# Patient Record
Sex: MALE | Race: WHITE | NOT HISPANIC OR LATINO | Employment: FULL TIME | URBAN - METROPOLITAN AREA
[De-identification: names, ages, dates, MRNs, and addresses within clinical notes are randomized per-mention and may not be internally consistent; named-entity substitution may affect disease eponyms.]

---

## 2021-04-08 DIAGNOSIS — Z23 ENCOUNTER FOR IMMUNIZATION: ICD-10-CM

## 2021-09-08 ENCOUNTER — OFFICE VISIT (OUTPATIENT)
Dept: INTERNAL MEDICINE CLINIC | Facility: CLINIC | Age: 66
End: 2021-09-08
Payer: COMMERCIAL

## 2021-09-08 VITALS
HEIGHT: 68 IN | RESPIRATION RATE: 16 BRPM | WEIGHT: 185 LBS | BODY MASS INDEX: 28.04 KG/M2 | OXYGEN SATURATION: 98 % | TEMPERATURE: 98.1 F | DIASTOLIC BLOOD PRESSURE: 64 MMHG | SYSTOLIC BLOOD PRESSURE: 132 MMHG | HEART RATE: 80 BPM

## 2021-09-08 DIAGNOSIS — Z13.1 SCREENING FOR DIABETES MELLITUS: ICD-10-CM

## 2021-09-08 DIAGNOSIS — Z12.5 SCREENING FOR PROSTATE CANCER: ICD-10-CM

## 2021-09-08 DIAGNOSIS — Z11.59 NEED FOR HEPATITIS C SCREENING TEST: ICD-10-CM

## 2021-09-08 DIAGNOSIS — Z13.220 SCREENING FOR HYPERLIPIDEMIA: ICD-10-CM

## 2021-09-08 DIAGNOSIS — Z13.0 SCREENING FOR DEFICIENCY ANEMIA: ICD-10-CM

## 2021-09-08 DIAGNOSIS — Z23 NEED FOR VACCINATION: ICD-10-CM

## 2021-09-08 DIAGNOSIS — Z00.00 WELL ADULT EXAM: Primary | ICD-10-CM

## 2021-09-08 DIAGNOSIS — Z12.11 SCREENING FOR COLON CANCER: ICD-10-CM

## 2021-09-08 PROCEDURE — 3288F FALL RISK ASSESSMENT DOCD: CPT | Performed by: INTERNAL MEDICINE

## 2021-09-08 PROCEDURE — 90750 HZV VACC RECOMBINANT IM: CPT | Performed by: INTERNAL MEDICINE

## 2021-09-08 PROCEDURE — 1036F TOBACCO NON-USER: CPT | Performed by: INTERNAL MEDICINE

## 2021-09-08 PROCEDURE — 4040F PNEUMOC VAC/ADMIN/RCVD: CPT | Performed by: INTERNAL MEDICINE

## 2021-09-08 PROCEDURE — 99387 INIT PM E/M NEW PAT 65+ YRS: CPT | Performed by: INTERNAL MEDICINE

## 2021-09-08 PROCEDURE — 90472 IMMUNIZATION ADMIN EACH ADD: CPT | Performed by: INTERNAL MEDICINE

## 2021-09-08 PROCEDURE — 90732 PPSV23 VACC 2 YRS+ SUBQ/IM: CPT | Performed by: INTERNAL MEDICINE

## 2021-09-08 PROCEDURE — 1101F PT FALLS ASSESS-DOCD LE1/YR: CPT | Performed by: INTERNAL MEDICINE

## 2021-09-08 PROCEDURE — 90471 IMMUNIZATION ADMIN: CPT | Performed by: INTERNAL MEDICINE

## 2021-09-08 PROCEDURE — 1160F RVW MEDS BY RX/DR IN RCRD: CPT | Performed by: INTERNAL MEDICINE

## 2021-09-08 PROCEDURE — 3008F BODY MASS INDEX DOCD: CPT | Performed by: INTERNAL MEDICINE

## 2021-09-08 PROCEDURE — 3725F SCREEN DEPRESSION PERFORMED: CPT | Performed by: INTERNAL MEDICINE

## 2021-09-08 NOTE — PROGRESS NOTES
Assessment/Plan:    1  Well adult exam     2  Screening for deficiency anemia  Ambulatory referral to Gastroenterology    CBC and differential    Comprehensive metabolic panel    Lipid panel    Urinalysis with microscopic   3  Screening for diabetes mellitus  CBC and differential    Comprehensive metabolic panel    Lipid panel    Urinalysis with microscopic   4  Screening for hyperlipidemia  CBC and differential    Comprehensive metabolic panel    Lipid panel    Urinalysis with microscopic   5  Screening for prostate cancer  PSA, Total Screen   6  Screening for colon cancer  Cologuard    CANCELED: Ambulatory referral for colonoscopy   7  Need for hepatitis C screening test  Hepatitis C Antibody ([facility])        A&P Notes:    Subjective:      Patient ID: Ele Gurrola is a 78 y o  male who is here today for wellness visit and to establish care  He last saw primary care doctor over 10 years ago  He has enjoyed good health  Underwent cholecystectomy 10-15 years ago had a complication of a neck common bile duct which required surgical repair thereafter  Since that time he does have some chronic indigestion  Currently is no digestive symptoms but had a diarrheal illness with some nausea, no vomiting and some crampy abdominal pain last month for about 10 days  He has had no recurrence symptoms since  His medical history is very favorable  He had pneumonia around age 35, but otherwise had no acute medical illnesses requiring hospitalization  He has no chronic medical problems including hypertension, diabetes, hyperlipidemia and has no history of heart disease  He is still working full time from home during the pandemic and has made a good transition  His wife is also working full-time  [place] the [place] [address] [place] and is physically active  This includes chopping wood, going for long walks and he has excellent exercise tolerance    This includes no chest pain or shortness of breath  He sees an eye doctor every other year and reports good vision with some presbyopia  He reports excellent hearing  He has no history of skin cancer and declines dermatology screening  He declines colonoscopy because his wife had a bad experience  He has had Cologuard the past that has been negative  He is agreeable to Cologuard testing  We discussed vaccines and he has had the [address] vaccine series, had TdaP vaccination in the last 2-3 years, and we did discussed vaccination for shingles and Pneumovax which were today  He will come in in 2 months for repeat shingles vaccination  History reviewed  No pertinent past medical history  Family History   Problem Relation Age of Onset    No Known Problems Mother     Stroke Father     Breast cancer Sister         Social History     Socioeconomic History    Marital status: /Civil Union     Spouse name: Not on file    Number of children: Not on file    Years of education: Not on file    Highest education level: Not on file   Occupational History    Not on file   Tobacco Use    Smoking status: Never Smoker    Smokeless tobacco: Never Used   Vaping Use    Vaping Use: Never used   Substance and Sexual Activity    Alcohol use: Yes     Comment: social    Drug use: Never    Sexual activity: Not on file   Other Topics Concern    Not on file   Social History Narrative    Not on file     Social Determinants of Health     Financial Resource Strain:     Difficulty of Paying Living Expenses:    Food Insecurity:     Worried About Running Out of Food in the Last Year:     Ran Out of Food in the Last Year:    Transportation Needs:     Lack of Transportation (Medical):      Lack of Transportation (Non-Medical):    Physical Activity:     Days of Exercise per Week:     Minutes of Exercise per Session:    Stress:     Feeling of Stress :    Social Connections:     Frequency of Communication with Friends and Family:  Frequency of Social Gatherings with Friends and Family:     Attends Christianity Services:     Active Member of Clubs or Organizations:     Attends Club or Organization Meetings:     Marital Status:    Intimate Partner Violence:     Fear of Current or Ex-Partner:     Emotionally Abused:     Physically Abused:     Sexually Abused:         No Known Allergies     No current outpatient medications on file  No current facility-administered medications for this visit  Review of Systems   Constitutional: Negative  HENT: Negative  Eyes: Negative  Respiratory: Negative  Cardiovascular: Negative  Gastrointestinal: Negative  Endocrine: Negative  Genitourinary: Negative  Musculoskeletal: Negative  Skin: Negative  Allergic/Immunologic: Negative  Neurological: Negative  Hematological: Negative  Psychiatric/Behavioral: Negative  Objective:      /64   Pulse 80   Temp 98 1 °F (36 7 °C)   Resp 16   Ht 5' 8" (1 727 m)   Wt 83 9 kg (185 lb)   SpO2 98%   BMI 28 13 kg/m²      Wt Readings from Last 3 Encounters:   09/08/21 83 9 kg (185 lb)     Temp Readings from Last 3 Encounters:   09/08/21 98 1 °F (36 7 °C)     BP Readings from Last 3 Encounters:   09/08/21 132/64     Pulse Readings from Last 3 Encounters:   09/08/21 80       Physical Exam   VSS, afebrile, pulse regular    Alert and Oriented in NAD    HEENT: NCAT, Pupils equal, 3 mm, EOEMI, no icterus or pallor, no iritis or conjunctivitis  No head or neck mass or adenopathy  ENT: NAD    Ears:  normal-appearing external auditory canals and tympanic membranes,     Nose: patent nasal passages without polyps or masses, no septal deviation, no nasal deformity,     Oral cavity and throat: normal dentition, no gum disease, normal mucosa, patent airway, no hemorrhages or exudates in the throat    Exam of salivary glands and ducts are normal  No submandibular or submental adenopathy    Neck: supple, no trauma or tenderness  No JVD  Normal carotid upstroke and descent without bruits  Trachea midline without stridor  Thyroid exam normal on inspection and palpation  No spinal tenderness, full range of motion  Lymphatics: no adenopathy throughout    Chest:  No trauma or deformity, no supraclavicular adenopathy or bruit  Chest wall expansion is symmetric and normal, expiratory phase is not prolonged  Heart:  Regular rate and rhythm, normal W4-I8, no T9-F8, no clicks murmurs or rubs  Lungs:  Clear to auscultation and normal to percussion  Back:  No trauma or deformity, no CVA mass or CVA tenderness  Skin:  No rash or skin malignancy  There are scattered hemangiomas and benign keratoses  There is small abrasion of the anterior left ankle  Abdomen:  Nondistended, normal bowel sounds, soft nontender without masses bruits organomegaly  There is no hernia  There is a well-healed right subcostal scar without hernia  Extremities:  Arterial circulation is symmetrically normal with no clubbing cyanosis or edema  There are no varicosities, there is no calf tenderness or phlebitic findings  Joints:  No  swelling, redness, tenderness or increased temperature, no instability  There are no tophi  Affect:  Normal    Neurologic:  Normal and stable gait, and otherwise no focal findings as well  Cognitive: Intact

## 2021-09-16 LAB
ALBUMIN SERPL-MCNC: 4.2 G/DL (ref 3.8–4.8)
ALBUMIN/GLOB SERPL: 1.6 {RATIO} (ref 1.2–2.2)
ALP SERPL-CCNC: 76 IU/L (ref 44–121)
ALT SERPL-CCNC: 20 IU/L (ref 0–44)
AST SERPL-CCNC: 24 IU/L (ref 0–40)
BASOPHILS # BLD AUTO: 0.1 X10E3/UL (ref 0–0.2)
BASOPHILS NFR BLD AUTO: 1 %
BILIRUB SERPL-MCNC: 0.5 MG/DL (ref 0–1.2)
BUN SERPL-MCNC: 11 MG/DL (ref 8–27)
BUN/CREAT SERPL: 12 (ref 10–24)
CALCIUM SERPL-MCNC: 9.3 MG/DL (ref 8.6–10.2)
CHLORIDE SERPL-SCNC: 107 MMOL/L (ref 96–106)
CHOLEST SERPL-MCNC: 188 MG/DL (ref 100–199)
CO2 SERPL-SCNC: 20 MMOL/L (ref 20–29)
CREAT SERPL-MCNC: 0.91 MG/DL (ref 0.76–1.27)
EOSINOPHIL # BLD AUTO: 0.5 X10E3/UL (ref 0–0.4)
EOSINOPHIL NFR BLD AUTO: 9 %
ERYTHROCYTE [DISTWIDTH] IN BLOOD BY AUTOMATED COUNT: 13.1 % (ref 11.6–15.4)
GLOBULIN SER-MCNC: 2.6 G/DL (ref 1.5–4.5)
GLUCOSE SERPL-MCNC: 114 MG/DL (ref 65–99)
HCT VFR BLD AUTO: 46.8 % (ref 37.5–51)
HCV AB S/CO SERPL IA: <0.1 S/CO RATIO (ref 0–0.9)
HDLC SERPL-MCNC: 43 MG/DL
HGB BLD-MCNC: 16 G/DL (ref 13–17.7)
IMM GRANULOCYTES # BLD: 0 X10E3/UL (ref 0–0.1)
IMM GRANULOCYTES NFR BLD: 0 %
LDLC SERPL CALC-MCNC: 128 MG/DL (ref 0–99)
LYMPHOCYTES # BLD AUTO: 1.5 X10E3/UL (ref 0.7–3.1)
LYMPHOCYTES NFR BLD AUTO: 27 %
MCH RBC QN AUTO: 30.8 PG (ref 26.6–33)
MCHC RBC AUTO-ENTMCNC: 34.2 G/DL (ref 31.5–35.7)
MCV RBC AUTO: 90 FL (ref 79–97)
MONOCYTES # BLD AUTO: 0.5 X10E3/UL (ref 0.1–0.9)
MONOCYTES NFR BLD AUTO: 9 %
NEUTROPHILS # BLD AUTO: 3 X10E3/UL (ref 1.4–7)
NEUTROPHILS NFR BLD AUTO: 54 %
PLATELET # BLD AUTO: 230 X10E3/UL (ref 150–450)
POTASSIUM SERPL-SCNC: 4.3 MMOL/L (ref 3.5–5.2)
PROT SERPL-MCNC: 6.8 G/DL (ref 6–8.5)
PSA SERPL-MCNC: 2.7 NG/ML (ref 0–4)
RBC # BLD AUTO: 5.19 X10E6/UL (ref 4.14–5.8)
SL AMB EGFR AFRICAN AMERICAN: 100 ML/MIN/1.73
SL AMB EGFR NON AFRICAN AMERICAN: 87 ML/MIN/1.73
SL AMB INTERPRETATION: NORMAL
SL AMB VLDL CHOLESTEROL CALC: 17 MG/DL (ref 5–40)
SODIUM SERPL-SCNC: 140 MMOL/L (ref 134–144)
TRIGL SERPL-MCNC: 93 MG/DL (ref 0–149)
WBC # BLD AUTO: 5.5 X10E3/UL (ref 3.4–10.8)

## 2021-10-12 LAB — COLOGUARD RESULT REPORTABLE: NEGATIVE

## 2021-11-05 ENCOUNTER — CONSULT (OUTPATIENT)
Dept: GASTROENTEROLOGY | Facility: CLINIC | Age: 66
End: 2021-11-05
Payer: COMMERCIAL

## 2021-11-05 VITALS
HEIGHT: 68 IN | BODY MASS INDEX: 27.58 KG/M2 | DIASTOLIC BLOOD PRESSURE: 88 MMHG | SYSTOLIC BLOOD PRESSURE: 124 MMHG | HEART RATE: 55 BPM | WEIGHT: 182 LBS

## 2021-11-05 DIAGNOSIS — Z13.0 SCREENING FOR DEFICIENCY ANEMIA: ICD-10-CM

## 2021-11-05 DIAGNOSIS — Z12.11 SCREENING FOR COLON CANCER: Primary | ICD-10-CM

## 2021-11-05 PROCEDURE — 99244 OFF/OP CNSLTJ NEW/EST MOD 40: CPT | Performed by: INTERNAL MEDICINE

## 2021-11-05 PROCEDURE — 3008F BODY MASS INDEX DOCD: CPT | Performed by: INTERNAL MEDICINE

## 2021-11-05 PROCEDURE — 1036F TOBACCO NON-USER: CPT | Performed by: INTERNAL MEDICINE

## 2021-11-08 ENCOUNTER — TELEPHONE (OUTPATIENT)
Dept: GASTROENTEROLOGY | Facility: CLINIC | Age: 66
End: 2021-11-08

## 2022-01-20 ENCOUNTER — TELEPHONE (OUTPATIENT)
Dept: GASTROENTEROLOGY | Facility: CLINIC | Age: 67
End: 2022-01-20

## 2022-01-20 NOTE — TELEPHONE ENCOUNTER
Christa patient - Patient called and would like a RTRN call ([phone]) as he has questions about his upcoming procedure on 1/28 Thx

## 2022-01-21 NOTE — TELEPHONE ENCOUNTER
Spoke with patient and he lost his prep instructions which I emailed to him at [email or web address]  He was also concerned about covid in the hospital and whether it was safe  Provided him with information regarding the hospital following cdc guidelines for cleaning etc and advised it is a personal decision  He said he would keep the appt

## 2022-01-27 ENCOUNTER — TELEPHONE (OUTPATIENT)
Dept: GASTROENTEROLOGY | Facility: HOSPITAL | Age: 67
End: 2022-01-27

## 2022-01-28 ENCOUNTER — HOSPITAL ENCOUNTER (OUTPATIENT)
Dept: GASTROENTEROLOGY | Facility: HOSPITAL | Age: 67
Setting detail: OUTPATIENT SURGERY
Discharge: HOME | End: 2022-01-28
Attending: INTERNAL MEDICINE | Admitting: INTERNAL MEDICINE
Payer: COMMERCIAL

## 2022-01-28 ENCOUNTER — ANESTHESIA EVENT (OUTPATIENT)
Dept: GASTROENTEROLOGY | Facility: HOSPITAL | Age: 67
End: 2022-01-28

## 2022-01-28 ENCOUNTER — ANESTHESIA (OUTPATIENT)
Dept: GASTROENTEROLOGY | Facility: HOSPITAL | Age: 67
End: 2022-01-28

## 2022-01-28 VITALS
SYSTOLIC BLOOD PRESSURE: 103 MMHG | RESPIRATION RATE: 20 BRPM | DIASTOLIC BLOOD PRESSURE: 65 MMHG | HEIGHT: 68 IN | WEIGHT: 183.2 LBS | TEMPERATURE: 98.3 F | HEART RATE: 45 BPM | OXYGEN SATURATION: 94 % | BODY MASS INDEX: 27.77 KG/M2

## 2022-01-28 DIAGNOSIS — Z13.0 SCREENING FOR DEFICIENCY ANEMIA: ICD-10-CM

## 2022-01-28 PROCEDURE — G0121 COLON CA SCRN NOT HI RSK IND: HCPCS | Performed by: INTERNAL MEDICINE

## 2022-01-28 RX ORDER — PROPOFOL 10 MG/ML
INJECTION, EMULSION INTRAVENOUS AS NEEDED
Status: DISCONTINUED | OUTPATIENT
Start: 2022-01-28 | End: 2022-01-28

## 2022-01-28 RX ORDER — SODIUM CHLORIDE, SODIUM LACTATE, POTASSIUM CHLORIDE, CALCIUM CHLORIDE 600; 310; 30; 20 MG/100ML; MG/100ML; MG/100ML; MG/100ML
INJECTION, SOLUTION INTRAVENOUS CONTINUOUS PRN
Status: DISCONTINUED | OUTPATIENT
Start: 2022-01-28 | End: 2022-01-28

## 2022-01-28 RX ORDER — LIDOCAINE HYDROCHLORIDE 10 MG/ML
INJECTION, SOLUTION EPIDURAL; INFILTRATION; INTRACAUDAL; PERINEURAL AS NEEDED
Status: DISCONTINUED | OUTPATIENT
Start: 2022-01-28 | End: 2022-01-28

## 2022-01-28 RX ADMIN — SODIUM CHLORIDE, SODIUM LACTATE, POTASSIUM CHLORIDE, AND CALCIUM CHLORIDE: .6; .31; .03; .02 INJECTION, SOLUTION INTRAVENOUS at 07:10

## 2022-01-28 RX ADMIN — PROPOFOL 80 MG: 10 INJECTION, EMULSION INTRAVENOUS at 07:19

## 2022-01-28 RX ADMIN — PROPOFOL 20 MG: 10 INJECTION, EMULSION INTRAVENOUS at 07:29

## 2022-01-28 RX ADMIN — PROPOFOL 20 MG: 10 INJECTION, EMULSION INTRAVENOUS at 07:31

## 2022-01-28 RX ADMIN — PROPOFOL 40 MG: 10 INJECTION, EMULSION INTRAVENOUS at 07:26

## 2022-01-28 RX ADMIN — PROPOFOL 40 MG: 10 INJECTION, EMULSION INTRAVENOUS at 07:23

## 2022-01-28 RX ADMIN — PROPOFOL 20 MG: 10 INJECTION, EMULSION INTRAVENOUS at 07:22

## 2022-01-28 RX ADMIN — LIDOCAINE HYDROCHLORIDE 50 MG: 10 INJECTION, SOLUTION EPIDURAL; INFILTRATION; INTRACAUDAL at 07:19

## 2022-01-28 NOTE — ANESTHESIA PREPROCEDURE EVALUATION
Procedure:  COLONOSCOPY    Relevant Problems   No relevant active problems      CAD/PCI/MI/CHF - Denies  COPD/ASTHMA/PACO - Denies  GERD - Endorses, not on medication  PROBLEMS WITH PRIOR ANESTHESIA - Denies  NPO STATUS - Patient reports last solid PO intake on Wednesday and last clear liquid PO intake at 10 PM last night      Lab Results   Component Value Date    WBC 5 5 09/15/2021    HGB 16 0 09/15/2021    HCT 46 8 09/15/2021    MCV 90 09/15/2021     09/15/2021     Lab Results   Component Value Date    K 4 3 09/15/2021    CO2 20 09/15/2021     (H) 09/15/2021    BUN 11 09/15/2021    CREATININE 0 91 09/15/2021       Physical Exam    Airway    Mallampati score: II  TM Distance: >3 FB  Neck ROM: full     Dental   Comment: Partial upper denture,     Cardiovascular  Rhythm: regular,     Pulmonary  Pulmonary exam normal     Other Findings        Anesthesia Plan  ASA Score- 2     Anesthesia Type- IV sedation with anesthesia with ASA Monitors  Additional Monitors:   Airway Plan:           Plan Factors-Exercise tolerance (METS): >4 METS  Chart reviewed  Patient is not a current smoker  Patient did not smoke on day of surgery  Obstructive sleep apnea risk education given perioperatively  Induction- intravenous  Postoperative Plan-     Informed Consent- Anesthetic plan and risks discussed with patient  I personally reviewed this patient with the CRNA  Discussed and agreed on the Anesthesia Plan with the KYLAH Munson

## 2022-01-28 NOTE — ANESTHESIA POSTPROCEDURE EVALUATION
Post-Op Assessment Note    CV Status:  Stable    Pain management: adequate     Mental Status:  Sleepy and arousable   Hydration Status:  Euvolemic   PONV Controlled:  Controlled   Airway Patency:  Patent      Post Op Vitals Reviewed: Yes      Staff: CRNA         No complications documented      /60 (01/28/22 0739)    Temp 98 3 °F (36 8 °C) (01/28/22 0739)    Pulse (!) 46 (01/28/22 0739)   Resp 16 (01/28/22 0739)    SpO2 99 % (01/28/22 0739)

## 2022-01-28 NOTE — H&P
History and Physical -  [facility]  Karen Hyman 78 y o  male MRN: [id number]      HPI: Karen Hyman is a 78y o  year old male who presents for screening colonoscopy      REVIEW OF SYSTEMS: Per the HPI, and otherwise unremarkable  Historical Information   History reviewed  No pertinent past medical history  Past Surgical History:   Procedure Laterality Date    BILE DUCT EXPLORATION      GALLBLADDER SURGERY      HERNIA REPAIR       Social History   Social History     Substance and Sexual Activity   Alcohol Use Yes    Comment: social     Social History     Substance and Sexual Activity   Drug Use Never     Social History     Tobacco Use   Smoking Status Never Smoker   Smokeless Tobacco Never Used     Family History   Problem Relation Age of Onset    No Known Problems Mother     Stroke Father     Breast cancer Sister        Meds/Allergies     (Not in a hospital admission)      No Known Allergies    Objective     Blood pressure 156/83, pulse 55, temperature 98 °F (36 7 °C), temperature source Temporal, resp  rate 16, height 5' 8" (1 727 m), weight 83 1 kg (183 lb 3 2 oz), SpO2 99 %  PHYSICAL EXAM    Gen: NAD  CV: RRR  CHEST: Clear  ABD: soft, NT/ND  EXT: no edema      ASSESSMENT/PLAN:  This is a 78y o  year old male here for colonoscopy the, and he is stable and optimized for his procedure

## 2022-03-18 DIAGNOSIS — M25.551 RIGHT HIP PAIN: ICD-10-CM

## 2022-03-18 DIAGNOSIS — M25.552 LEFT HIP PAIN: Primary | ICD-10-CM

## 2022-03-22 ENCOUNTER — APPOINTMENT (OUTPATIENT)
Dept: RADIOLOGY | Facility: CLINIC | Age: 67
End: 2022-03-22
Payer: COMMERCIAL

## 2022-03-22 ENCOUNTER — OFFICE VISIT (OUTPATIENT)
Dept: OBGYN CLINIC | Facility: CLINIC | Age: 67
End: 2022-03-22
Payer: COMMERCIAL

## 2022-03-22 VITALS
BODY MASS INDEX: 28.46 KG/M2 | SYSTOLIC BLOOD PRESSURE: 126 MMHG | HEART RATE: 53 BPM | WEIGHT: 187.8 LBS | HEIGHT: 68 IN | DIASTOLIC BLOOD PRESSURE: 85 MMHG

## 2022-03-22 DIAGNOSIS — M16.0 BILATERAL PRIMARY OSTEOARTHRITIS OF HIP: Primary | ICD-10-CM

## 2022-03-22 DIAGNOSIS — M25.551 RIGHT HIP PAIN: ICD-10-CM

## 2022-03-22 DIAGNOSIS — M25.552 LEFT HIP PAIN: ICD-10-CM

## 2022-03-22 PROCEDURE — 73522 X-RAY EXAM HIPS BI 3-4 VIEWS: CPT

## 2022-03-22 PROCEDURE — 99203 OFFICE O/P NEW LOW 30 MIN: CPT | Performed by: ORTHOPAEDIC SURGERY

## 2022-03-22 PROCEDURE — 1160F RVW MEDS BY RX/DR IN RCRD: CPT | Performed by: ORTHOPAEDIC SURGERY

## 2022-03-22 PROCEDURE — 1036F TOBACCO NON-USER: CPT | Performed by: ORTHOPAEDIC SURGERY

## 2022-03-22 PROCEDURE — 3008F BODY MASS INDEX DOCD: CPT | Performed by: ORTHOPAEDIC SURGERY

## 2022-04-06 NOTE — PROGRESS NOTES
PT Evaluation     Today's date: 2022  Patient name: Mel Akhtar  : 1955  MRN: [id number]  Referring provider: Sadaf Fernandez MD  Dx:   Encounter Diagnosis     ICD-10-CM    1  Bilateral primary osteoarthritis of hip  M16 0 Ambulatory Referral to Physical Therapy                  Assessment  Assessment details: Mel Akhtar is a 78 y o  male who presents with signs and symptoms consistent of referring diagnosis  Patient presents with pain, decreased strength, decreased ROM and decreased joint mobility  Due to these impairments, Patient has difficulty performing a/iadls, recreational activities and engaging in social activities  Patient shows large amounts of muscle tightness around both hips but most notably around the R hip  He also presents with mild strength deficits which has led to diminished functional mobility and decreased activity tolerance  Patient would benefit from a comprehensive exercise program and HEP focusing on improving ROM, strength, motor control, functional mobility, and activity tolerance  Patient would benefit from skilled physical therapy to address the impairments, improve their level of function, and to improve their overall quality of life  PT POC 1x/wk for 8 weeks due to high co-insurance  Thank you for this referral      Impairments: abnormal gait, abnormal muscle firing, abnormal or restricted ROM, activity intolerance, impaired balance, impaired physical strength, lacks appropriate home exercise program and pain with function  Barriers to therapy: High co-insurance  Understanding of Dx/Px/POC: good   Prognosis: good    Goals  Short Term Goals: to be achieved by 4 weeks  1) Patient to be independent with basic HEP  2) Decrease pain to 3/10 at its worst   3) Increase hip flexion and abuction ROM by 5-10 degrees   4) Increase LE strength by 1/2 MMT grade in all deficient planes      Long Term Goals: to be achieved by discharge  1) FOTO equal to or greater than 69   2) Ambulation to improve to maximal level of function  3) Patient will be able to squat without pain  4) Patient will show symmetrical ROM in both hips B/L     5) Patient will be independent with comprehensive HEP  6) Patient will be able to walk for 30 minutes without an increase in pain  Plan  Plan details: PT POC 1x/wk for 8 weeks  Patient would benefit from: skilled physical therapy  Planned modality interventions: cryotherapy, thermotherapy: hydrocollator packs and manual electrical stimulation  Planned therapy interventions: activity modification, abdominal trunk stabilization, ADL retraining, ADL training, joint mobilization, massage, manual therapy, neuromuscular re-education, patient education, therapeutic activities, therapeutic exercise, home exercise program, gait training, functional ROM exercises and flexibility  Frequency: 1x week  Duration in weeks: 8  Plan of Care beginning date: 4/6/2022  Plan of Care expiration date: 6/1/2022  Treatment plan discussed with: patient        Subjective Evaluation    History of Present Illness  Mechanism of injury: History of Current Injury: Patient reports to PT this date because he has pain in both of his hips while walking and performing activities around the house  He reports that his hip pain has progressively gotten worse over the past 3-4 months as he has become more sedentary  Patient notes that the more he walks, the more painful it becomes  Currently, he is worried that he won't be able to be as active as he can this coming summer  Pain location/Descriptors: Both hips R moreso then left into the groin and sporadically into the legs   Aggravating factors: Walking, some movements  Easing factors: Rest  Imaging: Yes, X-ray   Special Questions: Luigi Carrion denies a new onset of Bladder incontinence, Bowel dysfunction and Ataxia  Denies saddle paresthesia     Patient goals: Be able to return to the things like hiking and walking   Hobbies/Interest: Skiing, hiking, walking   Occupation: work from home-      Pain  Current pain ratin  At best pain ratin  At worst pain ratin  Quality: dull ache and sharp    Patient Goals  Patient goals for therapy: increased strength, independence with ADLs/IADLs, return to sport/leisure activities, increased motion and decreased pain          Objective     Active Range of Motion   Left Hip   Flexion: 100 degrees with pain  Abduction: 40 degrees     Right Hip   Flexion: 100 degrees with pain  Abduction: 40 degrees with pain    Strength/Myotome Testing     Left Hip   Planes of Motion   Flexion: 4+  Extension: 4+  Abduction: 4  External rotation: 4+  Internal rotation: 4+    Right Hip   Planes of Motion   Flexion: 4+  Extension: 4  Abduction: 4  External rotation: 4  Internal rotation: 4+    Left Knee   Flexion: 5  Extension: 5    Right Knee   Flexion: 5  Extension: 5    Tests     Left Hip   Positive FADIR and scour  Negative ZOFIA  Modified Vick: Positive  Right Hip   Positive ZOFIA and FADIR  Modified Vick: Positive  Ambulation     Observational Gait   Gait: within functional limits     Functional Assessment      Squat    Left tibial anterior translation beyond toes and right tibial anterior translation beyond toes                Diagnosis: B/L Hip OA    Precautions: R>L- 30 mins sessions, hi co-insurance   POC Expires:    Re-evaluation Date:    FOTO Scores/Date: 69 ()   Visit Count        Manuals        LAD        Distraction with ER/IR                                Ther Ex        Bike        SLR        Hamstring Stretch        Quad Stretch HEP       Piriformis Stretch HEP       Clamshells        Glute Bridge                        Neuro Re-Ed        [employer] over hip extensions                                                                                               Ther Act              Step uos              TRX Squat             Modalities

## 2022-04-07 ENCOUNTER — EVALUATION (OUTPATIENT)
Dept: PHYSICAL THERAPY | Facility: CLINIC | Age: 67
End: 2022-04-07
Payer: COMMERCIAL

## 2022-04-07 DIAGNOSIS — M16.0 BILATERAL PRIMARY OSTEOARTHRITIS OF HIP: ICD-10-CM

## 2022-04-07 PROCEDURE — 97161 PT EVAL LOW COMPLEX 20 MIN: CPT

## 2022-04-14 ENCOUNTER — APPOINTMENT (OUTPATIENT)
Dept: PHYSICAL THERAPY | Facility: CLINIC | Age: 67
End: 2022-04-14
Payer: COMMERCIAL

## 2022-04-20 ENCOUNTER — OFFICE VISIT (OUTPATIENT)
Dept: PHYSICAL THERAPY | Facility: CLINIC | Age: 67
End: 2022-04-20
Payer: COMMERCIAL

## 2022-04-20 DIAGNOSIS — M16.0 BILATERAL PRIMARY OSTEOARTHRITIS OF HIP: Primary | ICD-10-CM

## 2022-04-20 PROCEDURE — 97140 MANUAL THERAPY 1/> REGIONS: CPT

## 2022-04-20 PROCEDURE — 97110 THERAPEUTIC EXERCISES: CPT

## 2022-04-20 NOTE — PROGRESS NOTES
Daily Note     Today's date: 2022  Patient name: Edis Gutierrez  : 1955  MRN: [id number]  Referring provider: Rod Durand MD  Dx:   Encounter Diagnosis     ICD-10-CM    1  Bilateral primary osteoarthritis of hip  M16 0                   Subjective: Patient reports increased hip pain this date as he assisted his wife in moving furniture around the house  He also notes that he has been unable to walk for long distances as their is an increase in hip pain  Does note not being compliant with hip stretches due to being very busy  Objective: See treatment diary below      Assessment: Tolerated treatment well  Session focused on providing patient with comprehensive HEP including stretching and strengthening exercises  Patient responded well to manual treatment with decreased pain during ambulation post LAD  Patient reported discomfort during exercises which provided resistance for the hip musculature  Instructed patient on beginning exercise regiment without resistance and building up as tolerance improved  Also explained importance of completing HEP to reduce symptoms  Patient was receptive to both pieces of information  Progress as tolerated  Patient would benefit from continued PT      Plan: Continue per plan of care        Diagnosis: B/L Hip OA    Precautions: R>L- 30 mins sessions, hi co-insurance   POC Expires:    Re-evaluation Date:    FOTO Scores/Date: 69 ()   Visit Count        Manuals  2      LAD  EB B/L      Distraction with ER/IR  EB R only                               Ther Ex        Bike        SLR        Hamstring Stretch  3x30" B/L      Quad Stretch HEP       Piriformis Stretch HEP       Clamshells  15x ea side       Glute Bridge  x10       Hip 3 way   Flex, abd, ext 10x ea direction rtb               Neuro Re-Ed        [employer] over hip extensions                                                                                               Ther Act              Step ups              TRX Squat             Modalities

## 2022-04-20 NOTE — RESULT ENCOUNTER NOTE
Simón Jerome,  Your labs are optimal overall  A blood sugar <125 is normal, so the value of 114 for your glucose is normal  Your cholesterol profile is optimal, PSA is normal as is your CBC  Your Hepatitis screen is negative     Dr Deana Boone [de-identified] : Euflexxa # 2 Bilateral knee\par Discussed at length with the patient the planned Euflexxa injection. The risks, benefits, convalescence and alternatives were reviewed. The possible side effects discussed included but were not limited to: pain, swelling, heat and redness. There symptoms are generally mild but if they are extensive then contact the office. Giving pain relievers by mouth such as NSAID’s or Tylenol can generally treat the reactions to Euflexxa. Rare cases of infection have been noted. Rash, hives and itching may occur post injection. If you have muscle pain or cramps, flushing and or swelling of the face, rapid heart beat, nausea, dizziness, fever, chills, headache, difficulty breathing, swelling in the arms or legs, or have a prickly feeling of your skin, contact a health care provider immediately.\par \par Following this discussion, the knee was prepped with betadine and under sterile condition the Euflexxa injection was performed with a 22 gauge needle. The needle was introduced into the joint, aspiration was performed to ensure intra-articular placement and the medication was injected. Upon withdrawal of the needle the site was cleaned with alcohol and a bandaid applied. The patient tolerated the injection well and there were no adverse effects. Post injection instructions included no strenuous activity for 24 hours, cryotherapy and if there are any adverse effects to contact the office.\par

## 2022-04-21 ENCOUNTER — APPOINTMENT (OUTPATIENT)
Dept: PHYSICAL THERAPY | Facility: CLINIC | Age: 67
End: 2022-04-21
Payer: COMMERCIAL

## 2022-04-27 ENCOUNTER — OFFICE VISIT (OUTPATIENT)
Dept: PHYSICAL THERAPY | Facility: CLINIC | Age: 67
End: 2022-04-27
Payer: COMMERCIAL

## 2022-04-27 DIAGNOSIS — M16.0 BILATERAL PRIMARY OSTEOARTHRITIS OF HIP: Primary | ICD-10-CM

## 2022-04-27 PROCEDURE — 97140 MANUAL THERAPY 1/> REGIONS: CPT

## 2022-04-27 PROCEDURE — 97110 THERAPEUTIC EXERCISES: CPT

## 2022-04-27 NOTE — PROGRESS NOTES
Daily Note     Today's date: 2022  Patient name: Mark Collins  : 1955  MRN: [id number]  Referring provider: Dionisio Godfrey MD  Dx:   Encounter Diagnosis     ICD-10-CM    1  Bilateral primary osteoarthritis of hip  M16 0        Start Time: 1630  Stop Time: 1700  Total time in clinic (min): 30 minutes    Subjective: pt reports he thinks his hips are feeling a little better  Stated that he has pain in the R groin  Objective: See treatment diary below      Assessment: Tolerated treatment well  Patient would benefit from continued PT  Added rec  Bike w/ good tolerance  Reviewed and updated HEP  Increased reps with bridges w/ good tolerance  Pt had decreased pain in groin post session  Plan: Continue per plan of care        Diagnosis: B/L Hip OA    Precautions: R>L- 30 mins sessions, hi co-insurance   POC Expires:    Re-evaluation Date:    FOTO Scores/Date: 69 ()   Visit Count   3     Manuals  2      LAD  EB B/L JH b/l     Distraction with ER/IR  EB R only  JH R only                             Ther Ex        Bike   5'     SLR   2x10     Hamstring Stretch  3x30" B/L 3x30" B/L     Quad Stretch HEP       Piriformis Stretch HEP       Clamshells  15x ea side       Glute Bridge  x10  2x10     Hip 3 way   Flex, abd, ext 10x ea direction rtb               Neuro Re-Ed        [employer] over hip extensions                                                                                               Ther Act              Step ups              TRX Squat      2x10       Modalities

## 2022-05-03 ENCOUNTER — OFFICE VISIT (OUTPATIENT)
Dept: OBGYN CLINIC | Facility: CLINIC | Age: 67
End: 2022-05-03
Payer: COMMERCIAL

## 2022-05-03 VITALS
BODY MASS INDEX: 28.22 KG/M2 | HEART RATE: 57 BPM | HEIGHT: 68 IN | DIASTOLIC BLOOD PRESSURE: 79 MMHG | WEIGHT: 186.2 LBS | SYSTOLIC BLOOD PRESSURE: 130 MMHG

## 2022-05-03 DIAGNOSIS — M79.605 BILATERAL LEG PAIN: Primary | ICD-10-CM

## 2022-05-03 DIAGNOSIS — M79.604 BILATERAL LEG PAIN: Primary | ICD-10-CM

## 2022-05-03 PROCEDURE — 1160F RVW MEDS BY RX/DR IN RCRD: CPT | Performed by: ORTHOPAEDIC SURGERY

## 2022-05-03 PROCEDURE — 99213 OFFICE O/P EST LOW 20 MIN: CPT | Performed by: ORTHOPAEDIC SURGERY

## 2022-05-03 PROCEDURE — 1036F TOBACCO NON-USER: CPT | Performed by: ORTHOPAEDIC SURGERY

## 2022-05-03 NOTE — PROGRESS NOTES
Orthopaedics Office Visit - Follow up Patient Visit    ASSESSMENT/PLAN:    Assessment:   Bilateral hip osteoarthritis , mild  B/l leg pain     Cannot rule out lumbar spine pathology/radiculopathy given pain pattern and occasional radiation past knee    Patient would benefit from IA hip injection for diagnostic and possible therapeutic purposes vs lumbar spine evaluation/injections      Plan:   - Discussed conservative treatment with patient at length  - referral placed to Spine and Pain Management for further evaluation of potential lumbar radiculopathy, possible R hip IA injection  - Over the counter analgesics as needed / directed   - Ice / heat as directed   - Continue physical therapy as directed   - follow-up after evaluation by Spine and Pain pain management       To Do Next Visit:  Evaluate hip pain     _____________________________________________________  CHIEF COMPLAINT:  Chief Complaint   Patient presents with    Left Hip - Follow-up    Right Hip - Follow-up         SUBJECTIVE:  Ayala Healy is a 78 y o  male who presents  to the office for follow-up evaluation of bilateral hip pain  Patient states that his symptoms are minimally better  Patient states he continues to have anterior and lateral hip pain becomes worse with prolonged weight-bearing and range of motion of the hips  Patient states that he has been attending physical therapy as directed with no complaints  Patient states he has not been taking a type pain medication for the hips  Patient denies any numbness or tingling in his legs  Patient states that he does have some pain that wraps around the anterior aspect of his lower legs occasionally but states that it is very intermittent  Patient offers no other complaints at this time  PAST MEDICAL HISTORY:  History reviewed  No pertinent past medical history      PAST SURGICAL HISTORY:  Past Surgical History:   Procedure Laterality Date    BILE DUCT EXPLORATION      GALLBLADDER SURGERY  HERNIA REPAIR         FAMILY HISTORY:  Family History   Problem Relation Age of Onset    No Known Problems Mother     Stroke Father     Breast cancer Sister        SOCIAL HISTORY:  Social History     Tobacco Use    Smoking status: Never Smoker    Smokeless tobacco: Never Used   Vaping Use    Vaping Use: Never used   Substance Use Topics    Alcohol use: Yes     Comment: social    Drug use: Never       MEDICATIONS:  No current outpatient medications on file  ALLERGIES:  No Known Allergies    REVIEW OF SYSTEMS:  MSK: b/l hip pain   Neuro: WNL   Pertinent items are otherwise noted in HPI  A comprehensive review of systems was otherwise negative  LABS:  HgA1c: No results found for: HGBA1C  BMP:   Lab Results   Component Value Date    K 4 3 09/15/2021    CO2 20 09/15/2021     (H) 09/15/2021    BUN 11 09/15/2021    CREATININE 0 91 09/15/2021     CBC: No components found for: CBC    _____________________________________________________  PHYSICAL EXAMINATION:  Vital signs: /79   Pulse 57   Ht 5' 8" (1 727 m)   Wt 84 5 kg (186 lb 3 2 oz)   BMI 28 31 kg/m²   General: No acute distress, awake and alert  Psychiatric: Mood and affect appear appropriate  HEENT: Trachea Midline, No torticollis, no apparent facial trauma  Cardiovascular: No audible murmurs; Extremities appear perfused  Pulmonary: No audible wheezing or stridor  Skin: No open lesions; see further details (if any) below      MUSCULOSKELETAL EXAMINATION:  B/L hip examination:  - Patient sitting comfortably in the office in no acute distress   - no acute physical abnormalities present in hips  Extremities appear well perfused overall   - no bony or soft tissue tenderness palpation over the this time    - full range of motion of the hips with mild pain associated with internal rotation of the right hip  -  5/5 strength noted throughout the lower extremity muscle groups bilaterally  - NV intact    _____________________________________________________  STUDIES REVIEWED:  I personally reviewed the images and interpretation is as follows:  N/A       PROCEDURES PERFORMED:  No procedures were performed at this time           Stuart Barragan PA-C - baldomero Blackburn MD room air

## 2022-05-03 NOTE — PATIENT INSTRUCTIONS
- Discussed conservative treatment with patient at length  - referral placed to Spine and Pain Management for further evaluation of potential lumbar radiculopathy  - offered patient cortisone injection of hips    Patient declined at this time   - Over the counter analgesics as needed / directed   - Ice / heat as directed   - Continue physical therapy as directed   - follow-up after evaluation by Spine and Pain pain management

## 2022-05-04 ENCOUNTER — OFFICE VISIT (OUTPATIENT)
Dept: PHYSICAL THERAPY | Facility: CLINIC | Age: 67
End: 2022-05-04
Payer: COMMERCIAL

## 2022-05-04 DIAGNOSIS — M16.0 BILATERAL PRIMARY OSTEOARTHRITIS OF HIP: Primary | ICD-10-CM

## 2022-05-04 PROCEDURE — 97112 NEUROMUSCULAR REEDUCATION: CPT

## 2022-05-04 PROCEDURE — 97110 THERAPEUTIC EXERCISES: CPT

## 2022-05-04 NOTE — PROGRESS NOTES
Daily Note     Today's date: 2022  Patient name: Basil Essex  : 1955  MRN: [id number]  Referring provider: Dondra Councilman, MD  Dx:   Encounter Diagnosis     ICD-10-CM    1  Bilateral primary osteoarthritis of hip  M16 0                   Subjective: Patient reports going to see physician this past week where the doctor felt some of the discomfort could also be coming from the low back  Patient said doctor would like to see some exercises focusing on the core  Pt finds that his hips have gotten no better or worse  Objective: See treatment diary below      Assessment: Tolerated treatment well  Added core exercises this date to patient tolerance  Some mild discomfort noted with legs at 90 degrees although tolerable  Patient exhibits decreased motor control of hips leading to increased discomfort during functional activities such as stair climbing  Added step ups and retro walking this date to improve patient motor control and reliance on hips  Responded well although increased discomfort initially noted at 6" step but resolved once patient was placed on 4" step  Continue to progress as tolerated  Re-evaluation next session  Patient would benefit from continued PT  Plan: Continue per plan of care        Diagnosis: B/L Hip OA    Precautions: R>L- 30 mins sessions, hi co-insurance   POC Expires:    Re-evaluation Date:    FOTO Scores/Date: 69 ()   Visit Count   3 4    Manuals  2     ABDELRAHMAN  EB B/L [facility] BELEN b/l     Distraction with ER/IR  EB R only  JH R only                             Ther Ex        Bike   5' 6'    SLR   2x10     Hamstring Stretch  3x30" B/L 3x30" B/L     Quad Stretch HEP       Piriformis Stretch HEP       Clamshells  15x ea side       Glute Bridge  x10  2x10 2x10 pb under heels     Hip 3 way   Flex, abd, ext 10x ea direction rtb       Adductor stretch     5x ea side     DKTC    10x     Neuro Re-Ed        [employer] over hip extensions Supine Taps     2x10     Supine Marches     2x10      Retro walking     8x 17 5#     Step ups     focusing on leg drive 4" 05Q ea leg                                                           Ther Act                                           TRX Squat      2x10       Modalities

## 2022-05-11 ENCOUNTER — EVALUATION (OUTPATIENT)
Dept: PHYSICAL THERAPY | Facility: CLINIC | Age: 67
End: 2022-05-11
Payer: COMMERCIAL

## 2022-05-11 DIAGNOSIS — M16.0 BILATERAL PRIMARY OSTEOARTHRITIS OF HIP: Primary | ICD-10-CM

## 2022-05-11 PROCEDURE — 97140 MANUAL THERAPY 1/> REGIONS: CPT

## 2022-05-11 PROCEDURE — 97110 THERAPEUTIC EXERCISES: CPT

## 2022-05-11 NOTE — PROGRESS NOTES
PT Re-Evaluation     Today's date: 2022  Patient name: Ayala Healy  : 1955  MRN: [id number]  Referring provider: Vania Walsh MD  Dx:   Encounter Diagnosis     ICD-10-CM    1  Bilateral primary osteoarthritis of hip  M16 0                   Assessment  Assessment details: Ayala Healy is a 78 y o  male who presents with signs and symptoms consistent of referring diagnosis  Patient has made minimal progress since IE mostly in regards to his pain level  Patient continues to present with pain, decreased functional mobility, and diminished LE strength which has led to decreased activity tolerance  Patient has made some small gains in LE strength although still shows weakness most notably in hip abduction  Patient Mable Pelayo has improved since IE indicating his ability to perform functional activities although pain is still present  Patient will be following up with pain management regarding lumbar spine involvement  Based off of testing and presentations, hip OA still most likely cause of patient pain  Did trial repeated hip extensions this date which offered relief to patient  Instructed patient to continue with hip extensions moving forward if relief offered  Patient would continue to benefit from a exercise program and HEP continuing to focus on improving LE strength, WB tolerance, and functional mobility  PT POC 1x/wk for 4 weeks  Thank you for this referral    Impairments: abnormal gait, abnormal muscle firing, abnormal or restricted ROM, activity intolerance, impaired balance, impaired physical strength, lacks appropriate home exercise program and pain with function  Barriers to therapy: High co-insurance  Understanding of Dx/Px/POC: good   Prognosis: good    Goals  Short Term Goals: to be achieved by 4 weeks  1) Patient to be independent with basic HEP  - met  2) Decrease pain to 3/10 at its worst - not met  3) Increase hip flexion and abuction ROM by 5-10 degrees- not met   4) Increase LE strength by 1/2 MMT grade in all deficient planes  - progressing    Long Term Goals: to be achieved by discharge  1) FOTO equal to or greater than 69 - met  2) Ambulation to improve to maximal level of function- not met   3) Patient will be able to squat without pain  - met  4) Patient will show symmetrical ROM in both hips B/L  - not met   5) Patient will be independent with comprehensive HEP  - not met   6) Patient will be able to walk for 30 minutes without an increase in pain  - not met       Plan  Plan details: PT POC 1x/wk for 4 weeks  Patient would benefit from: skilled physical therapy  Planned modality interventions: cryotherapy, thermotherapy: hydrocollator packs and manual electrical stimulation  Planned therapy interventions: activity modification, abdominal trunk stabilization, ADL retraining, ADL training, joint mobilization, massage, manual therapy, neuromuscular re-education, patient education, therapeutic activities, therapeutic exercise, home exercise program, gait training, functional ROM exercises and flexibility  Frequency: 1x week  Duration in weeks: 4  Plan of Care beginning date: 4/6/2022  Plan of Care expiration date: 6/8/2022  Treatment plan discussed with: patient        Subjective Evaluation    History of Present Illness  Mechanism of injury: History of Current Injury: Patient reports to PT this date because he has pain in both of his hips while walking and performing activities around the house  He reports that his hip pain has progressively gotten worse over the past 3-4 months as he has become more sedentary  Patient notes that the more he walks, the more painful it becomes  Currently, he is worried that he won't be able to be as active as he can this coming summer  Pain location/Descriptors:  Both hips R moreso then left into the groin and sporadically into the legs   Aggravating factors: Walking, some movements  Easing factors: Rest  Imaging: Yes, X-ray   Special Questions: Christal gill a new onset of Bladder incontinence, Bowel dysfunction and Ataxia  Denies saddle paresthesia  Patient goals: Be able to return to the things like hiking and walking   Hobbies/Interest: Skiing, hiking, walking   Occupation: work from home-      Pain  Current pain ratin  At best pain ratin  At worst pain ratin  Quality: dull ache and sharp    Patient Goals  Patient goals for therapy: increased strength, independence with ADLs/IADLs, return to sport/leisure activities, increased motion and decreased pain      Saraora Braden reports a perceived improvement of 10%  Functional status measure is now 91  Patient notes pain has maintained since IE but frequency has decreased  Patient does not find that much has gotten easier since he started but he has maintained his function  Patient finds that he is not taking long walks and finds that he only tolerate shorter distance walking  Patient finds that he continues to have pain and discomfort during functional activities  Patient wishes to continue as he has found some benefit although he feels there is room for improvement  Overall, patient has made steady progress toward goals and would benefit from additional PT at this time  Objective     Active Range of Motion   Left Hip   Flexion: 100 degrees with pain  Abduction: 40 degrees     Right Hip   Flexion: 100 degrees with pain  Abduction: 40 degrees with pain    Strength/Myotome Testing     Left Hip   Planes of Motion   Flexion: 5  Extension: 4+  Abduction: 4  External rotation: 4+  Internal rotation: 4+    Right Hip   Planes of Motion   Flexion: 4+  Extension: 4  Abduction: 4  External rotation: 4  Internal rotation: 4+    Left Knee   Flexion: 5  Extension: 5    Right Knee   Flexion: 5  Extension: 5    Tests     Left Hip   Positive ZOFIAKLAUS and archie  Modified Vick: Positive  Right Hip   Positive ZOFIA and KLAUS  Modified Vick: Positive       Ambulation     Observational Gait   Gait: within functional limits     Functional Assessment      Squat    Left tibial anterior translation beyond toes and right tibial anterior translation beyond toes                   Diagnosis: B/L Hip OA    Precautions: R>L- 30 mins sessions, hi co-insurance   POC Expires: 6/1   Re-evaluation Date: 5/4   FOTO Scores/Date: 69 (4/7), 91 (5/11)   Visit Count   3 4 5   Manuals  2 4/27 5/4 5/11   LAD  EB B/L [facility] BELEN b/l     Distraction with ER/IR  EB R only  JH R only     Re-eval     EB                   Ther Ex        Bike   5' 6'    Repeated hip extensions      2x20 ea leg   x20 R w/IR    SLR   2x10     Hamstring Stretch  3x30" B/L 3x30" B/L     Quad Stretch HEP       Piriformis Stretch HEP       Clamshells  15x ea side       Glute Bridge  x10  2x10 2x10 pb under heels     Hip 3 way   Flex, abd, ext 10x ea direction rtb       Adductor stretch     5x ea side     DKTC    10x     Neuro Re-Ed        Glute Set        Quad Set        Bent over hip extensions        Supine Taps     2x10     Supine Marches     2x10      Retro walking     8x 17 5#     Step ups     focusing on leg drive 4" 60Y ea leg                                                           Ther Act                                           TRX Squat      2x10       Modalities

## 2022-05-18 ENCOUNTER — APPOINTMENT (OUTPATIENT)
Dept: PHYSICAL THERAPY | Facility: CLINIC | Age: 67
End: 2022-05-18
Payer: COMMERCIAL

## 2022-05-25 ENCOUNTER — APPOINTMENT (OUTPATIENT)
Dept: PHYSICAL THERAPY | Facility: CLINIC | Age: 67
End: 2022-05-25
Payer: COMMERCIAL

## 2022-05-31 ENCOUNTER — CONSULT (OUTPATIENT)
Dept: PAIN MEDICINE | Facility: CLINIC | Age: 67
End: 2022-05-31
Payer: COMMERCIAL

## 2022-05-31 VITALS
BODY MASS INDEX: 28.34 KG/M2 | SYSTOLIC BLOOD PRESSURE: 140 MMHG | WEIGHT: 187 LBS | HEIGHT: 68 IN | HEART RATE: 49 BPM | DIASTOLIC BLOOD PRESSURE: 80 MMHG

## 2022-05-31 DIAGNOSIS — M79.604 BILATERAL LEG PAIN: ICD-10-CM

## 2022-05-31 DIAGNOSIS — M54.41 CHRONIC BILATERAL LOW BACK PAIN WITH BILATERAL SCIATICA: ICD-10-CM

## 2022-05-31 DIAGNOSIS — G89.29 CHRONIC BILATERAL LOW BACK PAIN WITH BILATERAL SCIATICA: ICD-10-CM

## 2022-05-31 DIAGNOSIS — M54.42 CHRONIC BILATERAL LOW BACK PAIN WITH BILATERAL SCIATICA: ICD-10-CM

## 2022-05-31 DIAGNOSIS — M16.0 BILATERAL PRIMARY OSTEOARTHRITIS OF HIP: Primary | ICD-10-CM

## 2022-05-31 DIAGNOSIS — M79.605 BILATERAL LEG PAIN: ICD-10-CM

## 2022-05-31 PROCEDURE — 99204 OFFICE O/P NEW MOD 45 MIN: CPT | Performed by: STUDENT IN AN ORGANIZED HEALTH CARE EDUCATION/TRAINING PROGRAM

## 2022-05-31 PROCEDURE — 3008F BODY MASS INDEX DOCD: CPT | Performed by: ORTHOPAEDIC SURGERY

## 2022-05-31 RX ORDER — IBUPROFEN 200 MG
TABLET ORAL EVERY 6 HOURS PRN
COMMUNITY

## 2022-05-31 NOTE — PROGRESS NOTES
Assessment:  1  Bilateral primary osteoarthritis of hip    2  Bilateral leg pain    3  Chronic bilateral low back pain with bilateral sciatica        Plan:  Orders Placed This Encounter   Procedures    FL spine and pain procedure     Standing Status:   Future     Standing Expiration Date:   5/31/2026     Order Specific Question:   Reason for Exam:     Answer:   right hip injection fluoro     Order Specific Question:   Anticoagulant hold needed? Answer:   no       New Medications Ordered This Visit   Medications    ibuprofen (MOTRIN) 200 mg tablet     Sig: Take by mouth every 6 (six) hours as needed for mild pain       My impressions and treatment recommendations were discussed in detail with the patient, who verbalized understanding and had no further questions  This is a 78year old male who presents to our office with chief complaint of right groin and bilateral leg pain  Also has intermittent low back pain as well  He has mild arthritis of the bilateral hips and has pain with provocative maneuvers in the right groin as noted below  Appears he may be clinically symptomatic from right hip pathology, however he does note bilateral leg pain with ambulation  He is otherwise neurologically intact on exam today  He is currently in physical therapy for his hips and I feel insert also focus on his low back and possible sciatica symptoms  Discussed in regards to the right hip pain, that we could perform intra-articular hip injection under fluoroscopic guidance for both diagnostic and potentially therapeutic purposes  If at the conclusion physical therapy, patient does not have significant relief, then may consider MRI of the lumbar spine to assess for any compressive pathology  Patient agreeable to the plan  He is taking ibuprofen 200 mg intermittently with notable relief of his leg symptoms  Advised that he can go up to 600 mg for more severe symptoms    He is otherwise quite active enjoys [facility] Prescription Drug Monitoring Program report was reviewed and was appropriate     Complete risks and benefits including bleeding, infection, tissue reaction, nerve injury and allergic reaction were discussed  The approach was demonstrated using models and literature was provided  Verbal and written consent was obtained  Discharge instructions were provided  I personally saw and examined the patient and I agree with the above discussed plan of care  History of Present Illness:    Ruben Uriarte is a 78 y o  male who presents to [facility] for initial evaluation of the above stated pain complaints  The patient has a past medical and chronic pain history as outlined in the assessment section  He was referred by CADNY Willson 78  [place],  [facility] patient has chief complaint of pain in hips and legs and small of back  This is a chronic issue for last 6 months  Patient works as an INT        Pain usually starts in the groin and outer hip area bilaterally, R>L  The pain in the "small" of his back is not debilitating and usually worsened with prolonged sitting  The worst issue is the pain traveling down the legs, which is new over the last 2 months  He is having some notable relief with advil with this  Goes past knees bilaterally but does not extend into the feet  Non-dermatomal distribution  Starts to feel it in legs after about ambulating half a mile  Denies cramping or weakness but feels "tiredness" in the legs which he attributes to pain  Denies bowel and bladder incontinence or saddle anesthesia  Pain began of an undetermined cause  Moderate intensity over the past month  3/10 with some interference with daily activities  Pain is intermittent and in no typical pattern  Described as shooting, pressure-like, throbbing, dull/aching  Increased with walking and exercising    No change with coughing, sneezing  Decreased with lying down, sitting, relaxation  No change with standing or bending  Has mild degenerative changes of the hips bilaterally  Orthopedic surgery has sent the patient here to help with differentiating whether pain is coming from back or hips  Past medical history includes GERD  Moderate relief with PT and exercise in the past       No tobacco marijuana use  Drinks alcohol 2 times a week  Uses ibuprofen 200 mg sparingly which does provide relief of his symptoms  Review of Systems:    Review of Systems      Patient Active Problem List   Diagnosis    Bilateral primary osteoarthritis of hip    Bilateral leg pain       History reviewed  No pertinent past medical history  Past Surgical History:   Procedure Laterality Date    BILE DUCT EXPLORATION      GALLBLADDER SURGERY      HERNIA REPAIR         Family History   Problem Relation Age of Onset    No Known Problems Mother     Stroke Father     Breast cancer Sister        Social History     Occupational History    Not on file   Tobacco Use    Smoking status: Never Smoker    Smokeless tobacco: Never Used   Vaping Use    Vaping Use: Never used   Substance and Sexual Activity    Alcohol use: Yes     Comment: social    Drug use: Never    Sexual activity: Not on file         Current Outpatient Medications:     ibuprofen (MOTRIN) 200 mg tablet, Take by mouth every 6 (six) hours as needed for mild pain, Disp: , Rfl:     Allergies   Allergen Reactions    Nuts - Food Allergy Eye Swelling and GI Intolerance       Physical Exam:    /80 (BP Location: Left arm, Patient Position: Sitting, Cuff Size: Standard)   Pulse (!) 49   Ht 5' 8" (1 727 m)   Wt 84 8 kg (187 lb)   BMI 28 43 kg/m²     Constitutional: normal, well developed, well nourished, alert, in no distress and non-toxic and no overt pain behavior    Eyes: anicteric  HEENT: grossly intact  Neck: supple, symmetric, trachea midline and no masses Pulmonary:even and unlabored  Cardiovascular:No edema or pitting edema present  Skin:Normal without rashes or lesions and well hydrated  Psychiatric:Mood and affect appropriate  Neurologic:Cranial Nerves II-XII grossly intact  Musculoskeletal: Stinchfield test positive on right, pain in groin with external rotation during ZOFIA test and also log rollling of RLE  Log rolling negative for groin pain on left  ZOFIA test induces pain in left hip       Lumbar Spine Exam    Appearance:  Normal lordosis  Palpation/Tenderness:  no tenderness or spasm  Sensory:  no sensory deficits noted  Range of Motion:  Full range of motion with no pain or limitations in flexion, extension, lateral flexion and rotation  Motor Strength:  Left hip flexion:  5/5  Left hip extension:  5/5  Right hip flexion:  5/5  Right hip extension:  5/5  Left knee flexion:  5/5  Left knee extension:  5/5  Right knee flexion:  5/5  Right knee extension:  5/5  Left foot dorsiflexion:  5/5  Left foot plantar flexion:  5/5  Right foot dorsiflexion:  5/5  Right foot plantar flexion:  5/5  Reflexes:  Left Patellar:  2+   Right Patellar:  2+   Left Achilles:  2+   Right Achilles:  2+   Special Tests:  Left Straight Leg Test:  negative  Right Straight Leg Test:  negative      Imaging  FL spine and pain procedure    (Results Pending)       Orders Placed This Encounter   Procedures    FL spine and pain procedure

## 2022-05-31 NOTE — PATIENT INSTRUCTIONS
Steroid Joint Injection   AMBULATORY CARE:   What you need to know about steroid joint injection:  A steroid joint injection is a procedure to inject steroid medicine into a joint  Steroid medicine decreases pain and inflammation  The injection may also contain an anesthetic (numbing medicine) to decrease pain  It may be done to treat conditions such as arthritis, gout, or carpal tunnel syndrome  The injections may be given in your knee, ankle, shoulder, elbow, or wrist  Injections may also be given in your hip, toe, thumb, or finger  How to prepare for steroid joint injection:  Your healthcare provider will talk to you about how to prepare for this procedure  He will tell you what medicines to take or not take on the day of your procedure  You may need to stop taking blood thinners several days before your procedure  What will happen during steroid joint injection:  You may be given local anesthesia to numb the area where the injection will be given  With local anesthesia, you may still feel pressure during the procedure, but you should not feel any pain  Your healthcare provider may use ultrasound or fluoroscopy (a type of x-ray) to guide the needle to the right area  He will then inject the steroid into your joint  A bandage will be placed on the injection site  What will happen after steroid joint injection:  You may have redness, warmth, or sweating in your face and chest right after the steroid injection  Steroids can affect blood sugar levels  If you have diabetes, you should check your blood sugars closely in the first 24 hours after your procedure  Risks of steroid joint injection:  You may get an infection in your joint  The injection may also cause more pain during the first 24 to 36 hours  You may need more than one injection to feel pain relief  The skin near the injection site may be damaged and become discolored or indented  This can happen if the steroid is placed too close to your skin   A tendon near the injection site may rupture or a nerve can be damaged  Contact your healthcare provider if:   You have fever or chills  You have redness or swelling in the injection site  You have more pain than usual in your joint for more than 72 hours  You have questions or concerns about your condition or care  Medicines:   Pain medicine  may be given  Ask how to take this medicine safely  Take your medicine as directed  Contact your healthcare provider if you think your medicine is not helping or if you have side effects  Tell him or her if you are allergic to any medicine  Keep a list of the medicines, vitamins, and herbs you take  Include the amounts, and when and why you take them  Bring the list or the pill bottles to follow-up visits  Carry your medicine list with you in case of an emergency  Self-care:   Leave the bandage on for 8 to 12 hours  Care for your wound as directed  Rest the area  as directed  You may need to decrease weight on certain joints, such as the knee, for a period of time  Ask when you can return to your daily activities  Elevate  your limb where the steroid injection was given  Elevate the limb above the level of your heart as often as you can  This will help decrease swelling and pain  Prop your limb on pillows or blankets to keep it elevated comfortably  Apply ice  on your joint for 15 to 20 minutes every hour or as directed  Use an ice pack, or put crushed ice in a plastic bag  Cover it with a towel  Ice helps prevent tissue damage and decreases swelling and pain  Follow up with your doctor as directed:  Write down your questions so you remember to ask them during your visits  © Copyright [address] 2022 Information is for End User's use only and may not be sold, redistributed or otherwise used for commercial purposes   All illustrations and images included in CareNotes® are the copyrighted property of A D A M [employer] Inc  or [employer]  The above information is an  only  It is not intended as medical advice for individual conditions or treatments  Talk to your doctor, nurse or pharmacist before following any medical regimen to see if it is safe and effective for you

## 2022-06-06 ENCOUNTER — TELEPHONE (OUTPATIENT)
Dept: RADIOLOGY | Facility: CLINIC | Age: 67
End: 2022-06-06

## 2022-06-13 NOTE — PROGRESS NOTES
PT Discharge    Patient has not been seen by therapy since 5/11/22  Spoke with patient who is waiting for second opinion on his current status  Patient reports that he may be back for PT at end of July  Made patient aware that he would need another referral  Patient verbalized understanding  At this time d/c current POC

## 2022-06-20 NOTE — TELEPHONE ENCOUNTER
S/w pt and scheduled Right Hip injection for 7/26/22 9 am  Pt's wife is currently COVID positive and current insurance will termed 6/30/22  Pt unable to come 6/30/22, offered if any cxl for 6/28/22 will reach out  Pts new insurance will go into effect on 7/18/22

## 2022-08-12 ENCOUNTER — TELEPHONE (OUTPATIENT)
Dept: PAIN MEDICINE | Facility: CLINIC | Age: 67
End: 2022-08-12

## 2022-08-19 ENCOUNTER — TELEPHONE (OUTPATIENT)
Dept: OBGYN CLINIC | Facility: HOSPITAL | Age: 67
End: 2022-08-19

## 2022-08-19 DIAGNOSIS — M16.0 BILATERAL PRIMARY OSTEOARTHRITIS OF HIP: Primary | ICD-10-CM

## 2022-08-19 NOTE — TELEPHONE ENCOUNTER
DR Cynthia Prado  RE: USGI injection to right hip  CB: [phone]    Patient called stating that he would like to schedule USGI for the right hip  He had to cancel last appt secondary to change in insurance   Caller asked to speak to clinical team

## 2022-08-22 ENCOUNTER — TELEPHONE (OUTPATIENT)
Dept: OBGYN CLINIC | Facility: CLINIC | Age: 67
End: 2022-08-22

## 2022-08-29 ENCOUNTER — HOSPITAL ENCOUNTER (OUTPATIENT)
Dept: RADIOLOGY | Facility: HOSPITAL | Age: 67
Discharge: HOME | End: 2022-08-29
Admitting: RADIOLOGY
Payer: COMMERCIAL

## 2022-08-29 DIAGNOSIS — M16.0 BILATERAL PRIMARY OSTEOARTHRITIS OF HIP: ICD-10-CM

## 2022-08-29 PROCEDURE — 77002 NEEDLE LOCALIZATION BY XRAY: CPT

## 2022-08-29 PROCEDURE — 20610 DRAIN/INJ JOINT/BURSA W/O US: CPT

## 2022-08-29 RX ORDER — BUPIVACAINE HYDROCHLORIDE 5 MG/ML
3 INJECTION, SOLUTION PERINEURAL ONCE
Status: COMPLETED | OUTPATIENT
Start: 2022-08-29 | End: 2022-08-29

## 2022-08-29 RX ORDER — LIDOCAINE HYDROCHLORIDE 10 MG/ML
7 INJECTION, SOLUTION EPIDURAL; INFILTRATION; INTRACAUDAL; PERINEURAL ONCE
Status: COMPLETED | OUTPATIENT
Start: 2022-08-29 | End: 2022-08-29

## 2022-08-29 RX ORDER — METHYLPREDNISOLONE ACETATE 80 MG/ML
80 INJECTION, SUSPENSION INTRA-ARTICULAR; INTRALESIONAL; INTRAMUSCULAR; SOFT TISSUE ONCE
Status: COMPLETED | OUTPATIENT
Start: 2022-08-29 | End: 2022-08-29

## 2022-08-29 RX ADMIN — IOHEXOL 10 ML: 300 INJECTION, SOLUTION INTRAVENOUS at 10:15

## 2022-08-29 RX ADMIN — BUPIVACAINE HYDROCHLORIDE 3 ML: 5 INJECTION, SOLUTION PERINEURAL at 10:15

## 2022-08-29 RX ADMIN — METHYLPREDNISOLONE ACETATE 80 MG: 80 INJECTION, SUSPENSION INTRA-ARTICULAR; INTRALESIONAL; INTRAMUSCULAR; SOFT TISSUE at 10:15

## 2022-08-29 RX ADMIN — LIDOCAINE HYDROCHLORIDE 7 ML: 10 INJECTION, SOLUTION EPIDURAL; INFILTRATION; INTRACAUDAL at 10:15

## 2022-09-13 ENCOUNTER — OFFICE VISIT (OUTPATIENT)
Dept: INTERNAL MEDICINE CLINIC | Facility: CLINIC | Age: 67
End: 2022-09-13
Payer: COMMERCIAL

## 2022-09-13 VITALS
SYSTOLIC BLOOD PRESSURE: 132 MMHG | HEIGHT: 68 IN | WEIGHT: 186 LBS | OXYGEN SATURATION: 99 % | RESPIRATION RATE: 17 BRPM | DIASTOLIC BLOOD PRESSURE: 76 MMHG | HEART RATE: 55 BPM | TEMPERATURE: 98 F | BODY MASS INDEX: 28.19 KG/M2

## 2022-09-13 DIAGNOSIS — Z00.00 ANNUAL PHYSICAL EXAM: ICD-10-CM

## 2022-09-13 DIAGNOSIS — Z12.5 SCREENING FOR PROSTATE CANCER: ICD-10-CM

## 2022-09-13 DIAGNOSIS — E78.00 PURE HYPERCHOLESTEROLEMIA: Primary | ICD-10-CM

## 2022-09-13 DIAGNOSIS — M16.0 BILATERAL PRIMARY OSTEOARTHRITIS OF HIP: ICD-10-CM

## 2022-09-13 DIAGNOSIS — E78.00 PURE HYPERCHOLESTEROLEMIA: ICD-10-CM

## 2022-09-13 DIAGNOSIS — Z13.1 SCREENING FOR DIABETES MELLITUS: ICD-10-CM

## 2022-09-13 DIAGNOSIS — M16.0 BILATERAL PRIMARY OSTEOARTHRITIS OF HIP: Primary | ICD-10-CM

## 2022-09-13 PROBLEM — M79.604 BILATERAL LEG PAIN: Status: RESOLVED | Noted: 2022-05-03 | Resolved: 2022-09-13

## 2022-09-13 PROBLEM — M79.605 BILATERAL LEG PAIN: Status: RESOLVED | Noted: 2022-05-03 | Resolved: 2022-09-13

## 2022-09-13 PROCEDURE — 3725F SCREEN DEPRESSION PERFORMED: CPT | Performed by: INTERNAL MEDICINE

## 2022-09-13 PROCEDURE — 99397 PER PM REEVAL EST PAT 65+ YR: CPT | Performed by: INTERNAL MEDICINE

## 2022-09-13 RX ORDER — NAPROXEN 500 MG/1
500 TABLET ORAL 2 TIMES DAILY PRN
Qty: 30 TABLET | Refills: 3 | Status: SHIPPED | OUTPATIENT
Start: 2022-09-13 | End: 2022-10-13

## 2022-09-13 NOTE — ASSESSMENT & PLAN NOTE
Reports of worsening BL hip pain (R > L) that has caused him to have decreased physical activity  Etiology: Arthritis of hips  Has seen Ortho and received a steroid injection, for which he reports of improved temporary pain relief  Has tried PT and occasionally takes Aleve  Plan:  Ordered for Naproxen 500mg to be used BID PRN for pain  Instructed pt to take naproxen PRIOR to engaging in strenuous physical activity

## 2022-09-13 NOTE — PROGRESS NOTES
[facility] INTERNAL MEDICINE    NAME: Mil Villafuerte  AGE: 75 y o  SEX: male  : 1955     DATE: 2022     Assessment and Plan:     Problem List Items Addressed This Visit        Musculoskeletal and Integument    Bilateral primary osteoarthritis of hip - Primary     Reports of worsening BL hip pain (R > L) that has caused him to have decreased physical activity  Etiology: Arthritis of hips  Has seen Ortho and received a steroid injection, for which he reports of improved temporary pain relief  Has tried PT and occasionally takes Aleve  Plan:  Ordered for Naproxen 500mg to be used BID PRN for pain  Instructed pt to take naproxen PRIOR to engaging in strenuous physical activity  Relevant Medications    naproxen (Naprosyn) 500 mg tablet    Other Relevant Orders    CBC and differential       Other    Pure hypercholesterolemia    Relevant Orders    Comprehensive metabolic panel    Lipid panel      Other Visit Diagnoses     Screening for diabetes mellitus        Relevant Orders    Comprehensive metabolic panel    Urinalysis with microscopic    Hemoglobin A1C    Screening for prostate cancer        Relevant Orders    PSA, Total Screen    Annual physical exam              Immunizations and preventive care screenings were discussed with patient today  Appropriate education was printed on patient's after visit summary  Counseling:  Alcohol/drug use: discussed moderation in alcohol intake, the recommendations for healthy alcohol use, and avoidance of illicit drug use  Dental Health: discussed importance of regular tooth brushing, flossing, and dental visits  · Exercise: the importance of regular exercise/physical activity was discussed  Recommend exercise 3-5 times per week for at least 30 minutes  No follow-ups on file       Chief Complaint:     Chief Complaint   Patient presents with    Annual Exam      History of Present Illness:     Adult Annual Physical   Patient here for a comprehensive physical exam  The patient reports of bilateral hip pain that is worse on the right side than the left  He was last seen last year and has no other complaints  He had a colonoscopy in January 2022 and it was negative, so he will be due for another one in 10 years  He has not seen a dentist yet ever since moving to [place], but is in search of one  He does not get regular eye exams, but is open to getting one once he finds a provider  He has agreed to get new annual blood work done as well  Diet and Physical Activity  · Diet/Nutrition: well balanced diet and adequate fiber intake  · Exercise: likes to kayak and swim  However, due to his hip pain he has been less physically active  He is interested in starting up biking again         Depression Screening  PHQ-2/9 Depression Screening    Little interest or pleasure in doing things: 0 - not at all  Feeling down, depressed, or hopeless: 0 - not at all  PHQ-2 Score: 0  PHQ-2 Interpretation: Negative depression screen       General Health  · Sleep: gets 7-8 hours of sleep on average  · Hearing: normal - bilateral   · Vision: does not have any visual changes and uses readers occassionally  · Dental: no dental visits for >1 year  Has not seen a dentist since the start of the [place] pandemic because he and his family moved and have not found a dentist yet   Health  · Symptoms include: none     Review of Systems:     Review of Systems   Constitutional: Positive for activity change (decreased)  Negative for appetite change, chills, fatigue, fever and unexpected weight change  HENT: Negative for trouble swallowing  Eyes: Negative for photophobia and visual disturbance  Respiratory: Negative for cough and shortness of breath  Cardiovascular: Negative for chest pain and leg swelling  Gastrointestinal: Negative for abdominal pain, constipation, diarrhea, nausea and vomiting  Genitourinary: Negative for dysuria  Musculoskeletal: Positive for arthralgias (bilateral hips (Right > left))  Skin: Negative for rash  Neurological: Negative for dizziness, light-headedness and headaches  Past Medical History:     History reviewed  No pertinent past medical history  Past Surgical History:     Past Surgical History:   Procedure Laterality Date    BILE DUCT EXPLORATION      FL INJECTION RIGHT HIP (NON ARTHROGRAM)  8/29/2022    GALLBLADDER SURGERY      HERNIA REPAIR        Family History:     Family History   Problem Relation Age of Onset    No Known Problems Mother     Stroke Father     Breast cancer Sister       Social History:     Social History     Socioeconomic History    Marital status: /Civil Union     Spouse name: None    Number of children: None    Years of education: None    Highest education level: None   Occupational History    None   Tobacco Use    Smoking status: Never Smoker    Smokeless tobacco: Never Used   Vaping Use    Vaping Use: Never used   Substance and Sexual Activity    Alcohol use: Yes     Comment: social    Drug use: Never    Sexual activity: None   Other Topics Concern    None   Social History Narrative    None     Social Determinants of Health     Financial Resource Strain: Not on file   Food Insecurity: Not on file   Transportation Needs: Not on file   Physical Activity: Not on file   Stress: Not on file   Social Connections: Not on file   Intimate Partner Violence: Not on file   Housing Stability: Not on file      Current Medications:     Current Outpatient Medications   Medication Sig Dispense Refill    naproxen (Naprosyn) 500 mg tablet Take 1 tablet (500 mg total) by mouth 2 (two) times a day as needed for mild pain or moderate pain Take 1 tablet (500mg total) by mouth 2 (two) times a day AS NEEDED for mild - moderate pain  Take tablet with meals   30 tablet 3    ibuprofen (MOTRIN) 200 mg tablet Take by mouth every 6 (six) hours as needed for mild pain (Patient not taking: Reported on 9/13/2022)       No current facility-administered medications for this visit  Allergies: Allergies   Allergen Reactions    Nuts - Food Allergy Eye Swelling and GI Intolerance      Physical Exam:     /76 (BP Location: Left arm, Patient Position: Sitting, Cuff Size: Adult)   Pulse 55   Temp 98 °F (36 7 °C) (Tympanic)   Resp 17   Ht 5' 7 5" (1 715 m)   Wt 84 4 kg (186 lb)   SpO2 99%   BMI 28 70 kg/m²     Physical Exam  Vitals reviewed  Constitutional:       General: He is not in acute distress  Appearance: Normal appearance  He is not ill-appearing or diaphoretic  HENT:      Head: Normocephalic  Nose: No congestion or rhinorrhea  Eyes:      General: No scleral icterus  Conjunctiva/sclera: Conjunctivae normal    Cardiovascular:      Rate and Rhythm: Normal rate and regular rhythm  Pulses: Normal pulses  Heart sounds: Normal heart sounds  No murmur heard  Pulmonary:      Effort: Pulmonary effort is normal  No respiratory distress  Breath sounds: Normal breath sounds  Chest:      Chest wall: No tenderness  Abdominal:      General: Bowel sounds are normal  There is no distension  Palpations: Abdomen is soft  Tenderness: There is no abdominal tenderness  There is no guarding or rebound  Musculoskeletal:         General: No tenderness  Right lower leg: No edema  Left lower leg: No edema  Skin:     General: Skin is warm  Neurological:      Mental Status: He is alert  Psychiatric:         Mood and Affect: Mood normal          Behavior: Behavior normal          Thought Content: Thought content normal          Judgment: Judgment normal       Nutrition Assessment and Intervention:     Reviewed food recall journal      Other interventions: Eats healthy  Has oatmeal with flax seeds for breakfast with occasional eggs  No real snacking      Physical Activity Assessment and Intervention:    Activity journal reviewed      Other interventions: Wants to increase his physical activity because it has been limited due to his hip pain  Emotional and Mental Well-being, Sleep, Connectedness Assessment and Intervention:    Sleep/stress assessment performed      Tobacco and Toxic Substance Assessment and Intervention:     Tobacco use screening performed    Alcohol and drug use screening performed      Other interventions: Does not smoke tobacco  Occasionally drinks alcohol (a beer)          Sharon Talbot DO  [facility] INTERNAL MEDICINE

## 2022-09-27 ENCOUNTER — OFFICE VISIT (OUTPATIENT)
Dept: OBGYN CLINIC | Facility: CLINIC | Age: 67
End: 2022-09-27
Payer: COMMERCIAL

## 2022-09-27 ENCOUNTER — APPOINTMENT (OUTPATIENT)
Dept: RADIOLOGY | Facility: CLINIC | Age: 67
End: 2022-09-27
Payer: COMMERCIAL

## 2022-09-27 VITALS
HEART RATE: 53 BPM | HEIGHT: 68 IN | SYSTOLIC BLOOD PRESSURE: 155 MMHG | DIASTOLIC BLOOD PRESSURE: 93 MMHG | BODY MASS INDEX: 28.22 KG/M2 | WEIGHT: 186.2 LBS

## 2022-09-27 DIAGNOSIS — M16.0 BILATERAL PRIMARY OSTEOARTHRITIS OF HIP: ICD-10-CM

## 2022-09-27 DIAGNOSIS — M25.551 PAIN IN RIGHT HIP: Primary | ICD-10-CM

## 2022-09-27 PROCEDURE — 99213 OFFICE O/P EST LOW 20 MIN: CPT | Performed by: ORTHOPAEDIC SURGERY

## 2022-09-27 PROCEDURE — 1160F RVW MEDS BY RX/DR IN RCRD: CPT | Performed by: ORTHOPAEDIC SURGERY

## 2022-09-27 PROCEDURE — 73522 X-RAY EXAM HIPS BI 3-4 VIEWS: CPT

## 2022-09-27 NOTE — PROGRESS NOTES
Orthopaedics Office Visit - Follow-up Patient Visit    ASSESSMENT/PLAN:    Assessment:   Right hip internal derangement  Right hip osteoarthritis      Plan:   · X-rays were reviewed  · The patient has tried and failed non operative treatment in the form of formal therapy and a FL guided intra-articular steroid injection  · We did discuss ordering a MRI of his right hip to evaluate for internal derangement as he has failed non operative treatment for right hip osteoarthritis  · Patient will follow up once the MRI is complete to discuss the results      To Do Next Visit:  MRI review     _____________________________________________________  CHIEF COMPLAINT:  Chief Complaint   Patient presents with    Right Hip - Follow-up    Left Hip - Follow-up         SUBJECTIVE:  Anthony Crenshaw is a 75 y o  male who presents to the office today for follow up evaluation right hip osteoarthritis  The patient underwent a FL guised right hip intra-articular steroid injection on 8/29/22 which he states did provide him with some relief  He states the lateral hip pain has improved  He notes pain into his groin  He states the pain can radiate down to his knee  He notes increased pain with walking  He describes the pain as muscular and stretching  He has also attended formal therapy  He has not been taking anything OTC for pain  He denies any numbness or tingling  PAST MEDICAL HISTORY:  History reviewed  No pertinent past medical history      PAST SURGICAL HISTORY:  Past Surgical History:   Procedure Laterality Date    BILE DUCT EXPLORATION      FL INJECTION RIGHT HIP (NON ARTHROGRAM)  8/29/2022    GALLBLADDER SURGERY      HERNIA REPAIR         FAMILY HISTORY:  Family History   Problem Relation Age of Onset    No Known Problems Mother     Stroke Father     Breast cancer Sister        SOCIAL HISTORY:  Social History     Tobacco Use    Smoking status: Never Smoker    Smokeless tobacco: Never Used   Vaping Use    Vaping Use: Never used   Substance Use Topics    Alcohol use: Yes     Comment: social    Drug use: Never       MEDICATIONS:    Current Outpatient Medications:     ibuprofen (MOTRIN) 200 mg tablet, Take by mouth every 6 (six) hours as needed for mild pain (Patient not taking: No sig reported), Disp: , Rfl:     naproxen (Naprosyn) 500 mg tablet, Take 1 tablet (500 mg total) by mouth 2 (two) times a day as needed for mild pain or moderate pain Take 1 tablet (500mg total) by mouth 2 (two) times a day AS NEEDED for mild - moderate pain  Take tablet with meals  , Disp: 30 tablet, Rfl: 3    ALLERGIES:  Allergies   Allergen Reactions    Nuts - Food Allergy Eye Swelling and GI Intolerance       REVIEW OF SYSTEMS:  MSK: as noted in HPI  Neuro: WNL  Pertinent items are otherwise noted in HPI  A comprehensive review of systems was otherwise negative  LABS:  HgA1c: No results found for: HGBA1C  BMP:   Lab Results   Component Value Date    K 4 3 09/15/2021    CO2 20 09/15/2021     (H) 09/15/2021    BUN 11 09/15/2021    CREATININE 0 91 09/15/2021     CBC: No components found for: CBC    _____________________________________________________  PHYSICAL EXAMINATION:  Vital signs: /93   Pulse (!) 53   Ht 5' 7 5" (1 715 m)   Wt 84 5 kg (186 lb 3 2 oz)   BMI 28 73 kg/m²   General: No acute distress, awake and alert  Psychiatric: Mood and affect appear appropriate  HEENT: Trachea Midline, No torticollis, no apparent facial trauma  Cardiovascular: No audible murmurs;  Extremities appear perfused  Pulmonary: No audible wheezing or stridor  Skin: No open lesions; see further details (if any) below    MUSCULOSKELETAL EXAMINATION:  Extremities:  Right hip  No open skin lesions  + stinchfield  ROM with pain with ER and IR  NVI      _____________________________________________________  STUDIES REVIEWED:  I personally reviewed the images and interpretation is as follows: X-rays bilateral hip performed in the office today demonstrate bilateral hip osteoarthritis slightly worse when compared to previous films         PROCEDURES PERFORMED:  Procedures    Scribe Attestation    I,:  Ana Guerra MA am acting as a scribe while in the presence of the attending physician :       I,:  Dimple Boone MD personally performed the services described in this documentation    as scribed in my presence :

## 2022-09-28 ENCOUNTER — TELEPHONE (OUTPATIENT)
Dept: OBGYN CLINIC | Facility: HOSPITAL | Age: 67
End: 2022-09-28

## 2022-09-28 RX ORDER — LORAZEPAM 1 MG/1
1 TABLET ORAL EVERY 8 HOURS PRN
Qty: 2 TABLET | Refills: 0 | Status: SHIPPED | OUTPATIENT
Start: 2022-09-28

## 2022-09-28 NOTE — TELEPHONE ENCOUNTER
DR Ayala Quiet  RE MRI   [facility] [phone]    Patient is seen by Dr Jovita Nayak and was given orders for an MRI  Patient is scheduled for MRI on October 18  Patient stated , from past experience, he did not do well and will need some type of sedation   Patient does not remember what he took in the past      Please call patient to advise    [facility]

## 2022-09-30 LAB — HBA1C MFR BLD HPLC: 6.1 %

## 2022-10-01 LAB
ALBUMIN SERPL-MCNC: 4.5 G/DL (ref 3.8–4.8)
ALBUMIN/GLOB SERPL: 1.7 {RATIO} (ref 1.2–2.2)
ALP SERPL-CCNC: 78 IU/L (ref 44–121)
ALT SERPL-CCNC: 20 IU/L (ref 0–44)
AST SERPL-CCNC: 22 IU/L (ref 0–40)
BASOPHILS # BLD AUTO: 0 X10E3/UL (ref 0–0.2)
BASOPHILS NFR BLD AUTO: 1 %
BILIRUB SERPL-MCNC: 0.4 MG/DL (ref 0–1.2)
BUN SERPL-MCNC: 12 MG/DL (ref 8–27)
BUN/CREAT SERPL: 11 (ref 10–24)
CALCIUM SERPL-MCNC: 9.3 MG/DL (ref 8.6–10.2)
CHLORIDE SERPL-SCNC: 104 MMOL/L (ref 96–106)
CHOLEST SERPL-MCNC: 201 MG/DL (ref 100–199)
CO2 SERPL-SCNC: 22 MMOL/L (ref 20–29)
CREAT SERPL-MCNC: 1.05 MG/DL (ref 0.76–1.27)
EGFR: 77 ML/MIN/1.73
EOSINOPHIL # BLD AUTO: 0.2 X10E3/UL (ref 0–0.4)
EOSINOPHIL NFR BLD AUTO: 3 %
ERYTHROCYTE [DISTWIDTH] IN BLOOD BY AUTOMATED COUNT: 12.9 % (ref 11.6–15.4)
GLOBULIN SER-MCNC: 2.7 G/DL (ref 1.5–4.5)
GLUCOSE SERPL-MCNC: 95 MG/DL (ref 70–99)
HBA1C MFR BLD: 6.1 % (ref 4.8–5.6)
HCT VFR BLD AUTO: 47.9 % (ref 37.5–51)
HDLC SERPL-MCNC: 46 MG/DL
HGB BLD-MCNC: 15.7 G/DL (ref 13–17.7)
IMM GRANULOCYTES # BLD: 0 X10E3/UL (ref 0–0.1)
IMM GRANULOCYTES NFR BLD: 0 %
LDLC SERPL CALC-MCNC: 130 MG/DL (ref 0–99)
LYMPHOCYTES # BLD AUTO: 2.3 X10E3/UL (ref 0.7–3.1)
LYMPHOCYTES NFR BLD AUTO: 34 %
MCH RBC QN AUTO: 30 PG (ref 26.6–33)
MCHC RBC AUTO-ENTMCNC: 32.8 G/DL (ref 31.5–35.7)
MCV RBC AUTO: 91 FL (ref 79–97)
MONOCYTES # BLD AUTO: 0.7 X10E3/UL (ref 0.1–0.9)
MONOCYTES NFR BLD AUTO: 10 %
NEUTROPHILS # BLD AUTO: 3.4 X10E3/UL (ref 1.4–7)
NEUTROPHILS NFR BLD AUTO: 52 %
PLATELET # BLD AUTO: 267 X10E3/UL (ref 150–450)
POTASSIUM SERPL-SCNC: 4.4 MMOL/L (ref 3.5–5.2)
PROT SERPL-MCNC: 7.2 G/DL (ref 6–8.5)
PSA SERPL-MCNC: 3.5 NG/ML (ref 0–4)
RBC # BLD AUTO: 5.24 X10E6/UL (ref 4.14–5.8)
SL AMB VLDL CHOLESTEROL CALC: 25 MG/DL (ref 5–40)
SODIUM SERPL-SCNC: 140 MMOL/L (ref 134–144)
TRIGL SERPL-MCNC: 140 MG/DL (ref 0–149)
WBC # BLD AUTO: 6.7 X10E3/UL (ref 3.4–10.8)

## 2022-10-03 NOTE — RESULT ENCOUNTER NOTE
Please call Colby Sutton. His labs show that he has prediabetes and hyperlipidemia, so I would like to see him at his convenience and discuss treatment and prevention of diabetes. Thanks.

## 2022-10-10 ENCOUNTER — TELEPHONE (OUTPATIENT)
Dept: INTERNAL MEDICINE CLINIC | Facility: CLINIC | Age: 67
End: 2022-10-10

## 2022-10-10 NOTE — TELEPHONE ENCOUNTER
Pt called stating if he can get a test done for lyme disease  He states he is experiencing more pain and would like to rule lyme it out   He states he was treated for lyme disease in the past

## 2022-10-18 ENCOUNTER — TELEPHONE (OUTPATIENT)
Dept: OBGYN CLINIC | Facility: HOSPITAL | Age: 67
End: 2022-10-18

## 2022-10-23 ENCOUNTER — HOSPITAL ENCOUNTER (OUTPATIENT)
Dept: MRI IMAGING | Facility: HOSPITAL | Age: 67
Discharge: HOME | End: 2022-10-23
Attending: ORTHOPAEDIC SURGERY
Payer: COMMERCIAL

## 2022-10-23 DIAGNOSIS — M16.0 BILATERAL PRIMARY OSTEOARTHRITIS OF HIP: ICD-10-CM

## 2022-10-23 DIAGNOSIS — M25.551 PAIN IN RIGHT HIP: ICD-10-CM

## 2022-10-23 PROCEDURE — G1004 CDSM NDSC: HCPCS

## 2022-10-23 PROCEDURE — 73721 MRI JNT OF LWR EXTRE W/O DYE: CPT

## 2022-10-25 ENCOUNTER — TELEPHONE (OUTPATIENT)
Dept: OBGYN CLINIC | Facility: HOSPITAL | Age: 67
End: 2022-10-25

## 2022-10-25 NOTE — TELEPHONE ENCOUNTER
;Caller: Radiology    Doctor: Dr Marian Amezquita    Reason for call: MRI SIGNIFICANT FINDINGS IN Epic 10/23    Call back#: [phone]

## 2022-10-25 NOTE — TELEPHONE ENCOUNTER
Caller: Patient    Doctor: Carlene Fish    Reason for call: Patient received results of MRI and would like to discuss the findings      Call back#: [phone]

## 2022-10-26 ENCOUNTER — OFFICE VISIT (OUTPATIENT)
Dept: INTERNAL MEDICINE CLINIC | Facility: CLINIC | Age: 67
End: 2022-10-26

## 2022-10-26 ENCOUNTER — TELEPHONE (OUTPATIENT)
Dept: OBGYN CLINIC | Facility: MEDICAL CENTER | Age: 67
End: 2022-10-26

## 2022-10-26 VITALS
TEMPERATURE: 97.8 F | RESPIRATION RATE: 16 BRPM | HEART RATE: 60 BPM | HEIGHT: 67 IN | WEIGHT: 182 LBS | DIASTOLIC BLOOD PRESSURE: 80 MMHG | BODY MASS INDEX: 28.56 KG/M2 | SYSTOLIC BLOOD PRESSURE: 120 MMHG | OXYGEN SATURATION: 97 %

## 2022-10-26 DIAGNOSIS — R73.03 PREDIABETES: Primary | ICD-10-CM

## 2022-10-26 DIAGNOSIS — E78.00 PURE HYPERCHOLESTEROLEMIA: ICD-10-CM

## 2022-10-26 DIAGNOSIS — M16.11 PRIMARY OSTEOARTHRITIS OF RIGHT HIP: ICD-10-CM

## 2022-10-26 PROBLEM — M25.551 PAIN IN RIGHT HIP: Status: RESOLVED | Noted: 2022-09-27 | Resolved: 2022-10-26

## 2022-10-26 NOTE — PROGRESS NOTES
Assessment and Plan:    1  Prediabetes  Assessment & Plan:  Most recent A1c at 6 1  Chemistry panel within normal limits  New diagnosis of prediabetes  Patient had limited physical activity secondary to worsening right hip pain  Plan:   Discussed pathophysiology diagnosis/elevated A1c  Discussed lifestyle modifications - diet, exercise  Requires right hip OA management for improved physical activity  Repeat HbA1c  Follow-up in 3 months    Orders:  -     Hemoglobin A1C    2  Pure hypercholesterolemia  Assessment & Plan:  Patient without prior diagnosis of dyslipidemia  Total cholesterol 201 and   ASCVD score calculation at 17 3% risk of cardiovascular event the next 10 years  Moderate or high intensity statin recommended  Feels these results are secondary to his limited physical activity secondary to right hip pain    Plan:   Discussed indication for statin therapy - patient would like to hold off on therapy  Discussed lifestyle modifications - exercise, diet  Repeat lipid panel  Consider Lipitor 40 mg daily pending lab work and left eye modifications, at 3 month follow-up    Orders:  -     Lipid panel    3  Primary osteoarthritis of right hip  Assessment & Plan:  Patient evaluated by orthopedic surgery was in her in MRI which showed osteoarthritis, degenerative changes and moderate effusion      Plan:   Pain management with Acetaminophen/NSAIDs  Arranging follow-up appointment with Orthopedic surgery  Avoid weighted exercises  Can continue PT/OT exercises at home            Nutrition Assessment and Intervention:     Reviewed food recall journal    New Nutrition Prescription completed with patient      Physical Activity Assessment and Intervention:    Activity journal reviewed      Emotional and Mental Well-being, Sleep, Connectedness Assessment and Intervention:    Sleep/stress assessment performed      Tobacco and Toxic Substance Assessment and Intervention:     Tobacco use screening performed Alcohol and drug use screening performed      Therapeutic Lifestyle Change Visit:     One-on-one comprehensive counseling, coaching, and health behavior change visit completed         HPI:     Avtar Licona is  75 y o  male with history of osteoarthritis of right hip, that arrives to clinic for follow-up on lab work and right hip pain  Patient reports ongoing progressive right hip pain which she also localizes to the right lower back  Patient has noted new right knee pain likely secondary to compensatory changes during ambulation and standing  Patient has been doing some exercises recommended by his daughter who is a an occupational therapist   Has been managing pain with NSAIDs  Pain is affecting his ambulation, and he denies any recent fall or trauma  Denies any changes in sensation or weakness  Patient was seen by Orthopedics and underwent MRI of right hip which showed moderate osteoarthritis, impending subchondral fracture, degenerative changes, and moderate joint effusion  Patient is pending follow-up with orthopedics  Discussed with Roberto Bradshaw results from recent labs  Labs are significant for pre-diabetes given A1c of 6 1 and has an ASCVD score of 17 3 % given elevation in total cholesterol and LDL  Roberto Bradshaw saw his lab results prior to visit  Given these results, he already started to make diet modifications including limiting sugary treats and snacks, and substituting regular sugar with artificial sweetener  Patient's exercise/physical activity has been significantly limited given his right hip pain  Previous to this he was very physically active  Patient's alcholc intake is limited to once per week, now tobacco use, and no use of recreational drugs  Patient's sleep has not been affected due to pain  Patient has no other complaints at this time  Subjective:    Review of Systems   Constitutional: Positive for activity change  Negative for chills and fever     HENT: Negative for ear pain and sore throat  Eyes: Negative for pain and visual disturbance  Respiratory: Negative for cough and shortness of breath  Cardiovascular: Negative for chest pain, palpitations and leg swelling  Gastrointestinal: Negative for abdominal pain, constipation, nausea and vomiting  Genitourinary: Negative for dysuria and hematuria  Musculoskeletal: Positive for arthralgias (Right hip), back pain and gait problem  Negative for joint swelling, neck pain and neck stiffness  Skin: Negative for color change and rash  Neurological: Negative for seizures, syncope, weakness, numbness and headaches  Psychiatric/Behavioral: Negative for sleep disturbance  All other systems reviewed and are negative  Patient Active Problem List   Diagnosis   • Bilateral primary osteoarthritis of hip   • Pure hypercholesterolemia   • Prediabetes   • Primary osteoarthritis of right hip        Current Outpatient Medications   Medication Sig Dispense Refill   • ibuprofen (MOTRIN) 200 mg tablet Take by mouth every 6 (six) hours as needed for mild pain (Patient not taking: No sig reported)     • LORazepam (ATIVAN) 1 mg tablet Take 1 tablet (1 mg total) by mouth every 8 (eight) hours as needed for anxiety for up to 2 doses 2 tablet 0   • naproxen (Naprosyn) 500 mg tablet Take 1 tablet (500 mg total) by mouth 2 (two) times a day as needed for mild pain or moderate pain Take 1 tablet (500mg total) by mouth 2 (two) times a day AS NEEDED for mild - moderate pain  Take tablet with meals  30 tablet 3     No current facility-administered medications for this visit          Allergies   Allergen Reactions   • Nuts - Food Allergy Eye Swelling and GI Intolerance        The following portions of the patient's history were reviewed and updated as appropriate: allergies, current medications, past family history, past medical history, past social history, past surgical history and problem list              Objective:    /80   Pulse 60   Temp 97 8 °F (36 6 °C)   Resp 16   Ht 5' 7" (1 702 m)   Wt 82 6 kg (182 lb)   SpO2 97%   BMI 28 51 kg/m²      Body mass index is 28 51 kg/m²  Physical Exam  Vitals and nursing note reviewed  Constitutional:       General: He is not in acute distress  Appearance: Normal appearance  He is not ill-appearing  HENT:      Head: Normocephalic and atraumatic  Mouth/Throat:      Mouth: Mucous membranes are moist       Pharynx: Oropharynx is clear  Eyes:      General: No scleral icterus  Conjunctiva/sclera: Conjunctivae normal    Cardiovascular:      Rate and Rhythm: Normal rate and regular rhythm  Pulses: Normal pulses  Heart sounds: No murmur heard  No gallop  Pulmonary:      Effort: Pulmonary effort is normal  No respiratory distress  Breath sounds: Normal breath sounds  No wheezing or rales  Abdominal:      General: Bowel sounds are normal  There is no distension  Palpations: Abdomen is soft  There is no mass  Tenderness: There is no abdominal tenderness  Musculoskeletal:         General: No tenderness  Normal range of motion  Cervical back: Normal range of motion and neck supple  Lumbar back: No deformity or tenderness  Normal range of motion  Right hip: No deformity or tenderness  Normal range of motion  Normal strength  Right lower leg: No edema  Left lower leg: No edema  Comments: Increased pain at right hip with active and passive ROM of right hip, most prominent with hip extension and external rotation  Increased pain lower back pain with active range of motion of back, with flexion   Skin:     General: Skin is warm and dry  Capillary Refill: Capillary refill takes less than 2 seconds  Coloration: Skin is not jaundiced or pale  Neurological:      General: No focal deficit present  Mental Status: He is alert and oriented to person, place, and time  Mental status is at baseline        Sensory: No sensory deficit  Motor: No weakness     Psychiatric:         Mood and Affect: Mood normal          Behavior: Behavior normal

## 2022-10-26 NOTE — ASSESSMENT & PLAN NOTE
Patient without prior diagnosis of dyslipidemia  Total cholesterol 201 and   ASCVD score calculation at 17 3% risk of cardiovascular event the next 10 years  Moderate or high intensity statin recommended      Feels these results are secondary to his limited physical activity secondary to right hip pain    Plan:   · Discussed indication for statin therapy - patient would like to hold off on therapy  · Discussed lifestyle modifications - exercise, diet  · Repeat lipid panel  · Consider Lipitor 40 mg daily pending lab work and left eye modifications, at 3 month follow-up

## 2022-10-26 NOTE — ASSESSMENT & PLAN NOTE
Patient evaluated by orthopedic surgery was in her in MRI which showed osteoarthritis, degenerative changes and moderate effusion      Plan:   · Pain management with NSAIDs  · Encouraged to schedule a follow-up appointment with Orthopedic surgery

## 2022-10-26 NOTE — ASSESSMENT & PLAN NOTE
Most recent A1c at 6 1  Chemistry panel within normal limits  New diagnosis of prediabetes  Patient had limited physical activity secondary to worsening right hip pain  Plan:   · Discussed pathophysiology diagnosis/elevated A1c  · Discussed lifestyle modifications - diet, exercise    · Requires right hip OA management for improved physical activity  · Repeat HbA1c  · Follow-up in 3 months

## 2022-10-26 NOTE — ASSESSMENT & PLAN NOTE
Patient evaluated by orthopedic surgery was in her in MRI which showed osteoarthritis, degenerative changes and moderate effusion      Plan:   · Pain management with Acetaminophen/NSAIDs  · Arranging follow-up appointment with Orthopedic surgery  · Avoid weighted exercises  · Can continue PT/OT exercises at home

## 2022-10-26 NOTE — TELEPHONE ENCOUNTER
Caller: Dayanara Don at [facility]    Doctor: Brian Sommer    Reason for call:     Patient has asked Dayanara Don at the internal med office to reschedule his appointment with another doctor for right hip  Will the dr recommend another [facility] dr for this patient? Please call Usha's office to reschedule if so      Call back#: [phone]

## 2024-02-08 ENCOUNTER — OFFICE VISIT (OUTPATIENT)
Dept: URGENT CARE | Facility: CLINIC | Age: 69
End: 2024-02-08
Payer: COMMERCIAL

## 2024-02-08 VITALS
WEIGHT: 175 LBS | TEMPERATURE: 97.3 F | HEART RATE: 62 BPM | BODY MASS INDEX: 27.41 KG/M2 | SYSTOLIC BLOOD PRESSURE: 140 MMHG | DIASTOLIC BLOOD PRESSURE: 90 MMHG | OXYGEN SATURATION: 96 % | RESPIRATION RATE: 20 BRPM

## 2024-02-08 DIAGNOSIS — J01.90 ACUTE BACTERIAL RHINOSINUSITIS: Primary | ICD-10-CM

## 2024-02-08 DIAGNOSIS — B96.89 ACUTE BACTERIAL RHINOSINUSITIS: Primary | ICD-10-CM

## 2024-02-08 PROCEDURE — 99204 OFFICE O/P NEW MOD 45 MIN: CPT | Performed by: PHYSICIAN ASSISTANT

## 2024-02-08 RX ORDER — AZITHROMYCIN 250 MG/1
TABLET, FILM COATED ORAL
Qty: 6 TABLET | Refills: 0 | Status: SHIPPED | OUTPATIENT
Start: 2024-02-08 | End: 2024-02-12
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
## 2025-04-22 ENCOUNTER — DOCUMENTATION (OUTPATIENT)
Dept: ADMINISTRATIVE | Facility: OTHER | Age: 70
End: 2025-04-22